# Patient Record
Sex: MALE | Race: WHITE | NOT HISPANIC OR LATINO | Employment: FULL TIME | URBAN - METROPOLITAN AREA
[De-identification: names, ages, dates, MRNs, and addresses within clinical notes are randomized per-mention and may not be internally consistent; named-entity substitution may affect disease eponyms.]

---

## 2018-05-15 ENCOUNTER — HOSPITAL ENCOUNTER (OUTPATIENT)
Dept: PREADMISSION TESTING | Facility: OTHER | Age: 63
Discharge: HOME | End: 2018-05-15
Attending: OTOLARYNGOLOGY
Payer: COMMERCIAL

## 2018-05-15 ENCOUNTER — ANESTHESIA EVENT (OUTPATIENT)
Dept: SURGERY | Facility: OTHER | Age: 63
End: 2018-05-15
Payer: COMMERCIAL

## 2018-05-15 VITALS
OXYGEN SATURATION: 97 % | HEART RATE: 78 BPM | BODY MASS INDEX: 23.7 KG/M2 | HEIGHT: 69 IN | WEIGHT: 160 LBS | SYSTOLIC BLOOD PRESSURE: 119 MMHG | DIASTOLIC BLOOD PRESSURE: 73 MMHG | TEMPERATURE: 99 F

## 2018-05-15 RX ORDER — LIDOCAINE HYDROCHLORIDE 10 MG/ML
0.5 INJECTION, SOLUTION EPIDURAL; INFILTRATION; INTRACAUDAL; PERINEURAL ONCE
Status: CANCELLED | OUTPATIENT
Start: 2018-05-15 | End: 2018-05-15

## 2018-05-15 RX ORDER — MIDAZOLAM HYDROCHLORIDE 5 MG/ML
5 INJECTION INTRAMUSCULAR; INTRAVENOUS
Status: DISCONTINUED | OUTPATIENT
Start: 2018-05-15 | End: 2018-05-16 | Stop reason: HOSPADM

## 2018-05-15 RX ORDER — SODIUM CHLORIDE, SODIUM LACTATE, POTASSIUM CHLORIDE, CALCIUM CHLORIDE 600; 310; 30; 20 MG/100ML; MG/100ML; MG/100ML; MG/100ML
INJECTION, SOLUTION INTRAVENOUS CONTINUOUS
Status: CANCELLED | OUTPATIENT
Start: 2018-05-15

## 2018-05-15 NOTE — DISCHARGE INSTRUCTIONS
PRE-ADMIT TESTING -  [phone]    [address]  [place] [facility]          Your surgery has been scheduled at [facility]. We are pleased to have the opportunity to serve you. For Further Information please call [phone].    On the day of surgery please report to the Information Desk on the 1st floor.    · CONTACT YOUR PHYSICIAN'S OFFICE THE DAY PRIOR TO YOUR SURGERY TO OBTAIN YOUR ARRIVAL TIME.     · The evening before surgery do not eat anything after 9 p.m. ( this includes hard candy, chewing gum and mints).  You may only have GATORADE, POWERADE AND WATER  from 9 p.m. until you leave your home.   DO NOT DRINK ANY LIQUIDS ON THE WAY TO THE HOSPITAL.      SPECIAL MEDICATION INSTRUCTIONS: TAKE medications checked off by the Anesthesiologist on your Medication List.    Angiogram Patients: Take medications as instructed by your physician, including aspirin.     Surgery Patients:    If you take ASPIRIN - Your PHYSICIAN/SURGEON will need to inform you IF/OR when you need to stop taking aspirin prior to your surgery.     Do Not take any medications containing IBUPROFEN.  Do Not Wear any make-up or dark nail polish   (especially eye make-up) to surgery. If you come to surgery with makeup on you will be required to remove the makeup or nail polish.    Do not shave your surgical area at least 5 days prior to your surgery. The surgical prep will be performed at the hospital according to Infection Control regulations.    Leave all valuables at home.   Do Not wear any jewelry or watches, including any metal in body piercings.  Contact Lens must be removed before surgery. Either do not wear the contact lens or bring a case and solution for storage.  Please bring a container for eyeglasses or dentures as required.  Bring any paperwork your physician has provided, such as consent forms,  history and physicals, doctor's orders, etc.   Bring comfortable clothes that are loose fitting to wear upon  discharge. Take into consideration the type of surgery being performed.  Maintain your diet as advised per your physician the day prior to surgery.      Adequate rest the night before surgery is advised.   Park in the Parking lot behind the hospital or in the [facility] across the street from the parking lot. Parking is complimentary.  If you will be discharged the same day as your procedure, please arrange for a responsible adult to drive you home or to accompany you if traveling by taxi.   YOU WILL NOT BE PERMITTED TO DRIVE OR TO LEAVE THE HOSPITAL ALONE AFTER SURGERY.   It is strongly recommended that you arrange for someone to remain with you for the first 24 hrs following your surgery.       Thank you for your cooperation.  The Staff of [facility].        Bathing Instructions                                                                 Please shower the evening before and morning of your procedure with    ANTIBACTERIAL SOAP. ( DIAL, etc )  Concentrate on the surgical area   for at least 3 minutes and rinse completely. Dry off as usual.   Do not use any deodorant, powder, body lotions, perfume, after shave or    cologne.

## 2018-05-15 NOTE — ANESTHESIA PREPROCEDURE EVALUATION
05/15/2018  Bib Freire is a 62 y.o., male.    Anesthesia Evaluation    I have reviewed the Patient Summary Reports.    I have reviewed the Nursing Notes.   I have reviewed the Medications.     Review of Systems  Anesthesia Hx:  Denies Family Hx of Anesthesia complications.   Denies Personal Hx of Anesthesia complications.   Social:  Former Smoker    Hematology/Oncology:  Hematology Normal   Oncology Normal     Cardiovascular:   Hypertension hyperlipidemia    Pulmonary:  Pulmonary Normal    Renal/:  Renal/ Normal     Hepatic/GI:  Hepatic/GI Normal    Neurological:   Seizures (No seizure in 40 years), well controlled    Endocrine:  Endocrine Normal        Physical Exam   Airway/Jaw/Neck:  Airway Findings: Mouth Opening: Normal Tongue: Normal  General Airway Assessment: Adult  TM Distance: Normal, at least 6 cm      Dental:  Dental Findings: In tact        Mental Status:  Mental Status Findings:  Alert and Oriented, Cooperative         Anesthesia Plan  Type of Anesthesia, risks & benefits discussed:  Anesthesia Type:  general  Patient's Preference:   Intra-op Monitoring Plan: standard ASA monitors  Intra-op Monitoring Plan Comments:   Post Op Pain Control Plan:   Post Op Pain Control Plan Comments:   Induction:   IV  Beta Blocker:         Informed Consent: Patient understands risks and agrees with Anesthesia plan.  Questions answered. Anesthesia consent signed with patient.  ASA Score: 2     Day of Surgery Review of History & Physical:    H&P update referred to the surgeon.     Anesthesia Plan Notes: Pt had recent labs at [facility].  Will get results,results inchart        Ready For Surgery From Anesthesia Perspective.

## 2018-05-16 ENCOUNTER — ANESTHESIA (OUTPATIENT)
Dept: SURGERY | Facility: OTHER | Age: 63
End: 2018-05-16
Payer: COMMERCIAL

## 2018-05-16 ENCOUNTER — HOSPITAL ENCOUNTER (OUTPATIENT)
Facility: OTHER | Age: 63
Discharge: HOME | End: 2018-05-16
Attending: OTOLARYNGOLOGY | Admitting: OTOLARYNGOLOGY
Payer: COMMERCIAL

## 2018-05-16 VITALS
HEIGHT: 69 IN | WEIGHT: 160 LBS | DIASTOLIC BLOOD PRESSURE: 72 MMHG | RESPIRATION RATE: 16 BRPM | BODY MASS INDEX: 23.7 KG/M2 | SYSTOLIC BLOOD PRESSURE: 121 MMHG | OXYGEN SATURATION: 99 % | TEMPERATURE: 98 F | HEART RATE: 80 BPM

## 2018-05-16 DIAGNOSIS — R22.1 MASS OF NECK: Primary | ICD-10-CM

## 2018-05-16 PROCEDURE — 25000003 PHARM REV CODE 250: Performed by: OTOLARYNGOLOGY

## 2018-05-16 PROCEDURE — 36000707: Performed by: OTOLARYNGOLOGY

## 2018-05-16 PROCEDURE — 25000003 PHARM REV CODE 250: Performed by: NURSE ANESTHETIST, CERTIFIED REGISTERED

## 2018-05-16 PROCEDURE — 88364 INSITU HYBRIDIZATION (FISH): CPT | Mod: 26,,, | Performed by: PATHOLOGY

## 2018-05-16 PROCEDURE — 27201423 OPTIME MED/SURG SUP & DEVICES STERILE SUPPLY: Performed by: OTOLARYNGOLOGY

## 2018-05-16 PROCEDURE — 25000003 PHARM REV CODE 250: Performed by: ANESTHESIOLOGY

## 2018-05-16 PROCEDURE — 71000015 HC POSTOP RECOV 1ST HR: Performed by: OTOLARYNGOLOGY

## 2018-05-16 PROCEDURE — 36000706: Performed by: OTOLARYNGOLOGY

## 2018-05-16 PROCEDURE — 88305 TISSUE EXAM BY PATHOLOGIST: CPT | Performed by: PATHOLOGY

## 2018-05-16 PROCEDURE — 88305 TISSUE EXAM BY PATHOLOGIST: CPT | Mod: 26,,, | Performed by: PATHOLOGY

## 2018-05-16 PROCEDURE — 63600175 PHARM REV CODE 636 W HCPCS: Performed by: NURSE ANESTHETIST, CERTIFIED REGISTERED

## 2018-05-16 PROCEDURE — 88342 IMHCHEM/IMCYTCHM 1ST ANTB: CPT | Performed by: PATHOLOGY

## 2018-05-16 PROCEDURE — 37000008 HC ANESTHESIA 1ST 15 MINUTES: Performed by: OTOLARYNGOLOGY

## 2018-05-16 PROCEDURE — 88185 FLOWCYTOMETRY/TC ADD-ON: CPT | Mod: 59 | Performed by: PATHOLOGY

## 2018-05-16 PROCEDURE — 88184 FLOWCYTOMETRY/ TC 1 MARKER: CPT | Performed by: PATHOLOGY

## 2018-05-16 PROCEDURE — 88342 IMHCHEM/IMCYTCHM 1ST ANTB: CPT | Mod: 26,59,, | Performed by: PATHOLOGY

## 2018-05-16 PROCEDURE — 71000033 HC RECOVERY, INTIAL HOUR: Performed by: OTOLARYNGOLOGY

## 2018-05-16 PROCEDURE — 37000009 HC ANESTHESIA EA ADD 15 MINS: Performed by: OTOLARYNGOLOGY

## 2018-05-16 PROCEDURE — 88189 FLOWCYTOMETRY/READ 16 & >: CPT | Mod: ,,, | Performed by: PATHOLOGY

## 2018-05-16 PROCEDURE — 88365 INSITU HYBRIDIZATION (FISH): CPT | Mod: 26,,, | Performed by: PATHOLOGY

## 2018-05-16 PROCEDURE — 88341 IMHCHEM/IMCYTCHM EA ADD ANTB: CPT | Mod: 26,59,, | Performed by: PATHOLOGY

## 2018-05-16 RX ORDER — FENTANYL CITRATE 50 UG/ML
25 INJECTION, SOLUTION INTRAMUSCULAR; INTRAVENOUS EVERY 5 MIN PRN
Status: DISCONTINUED | OUTPATIENT
Start: 2018-05-16 | End: 2018-05-16 | Stop reason: HOSPADM

## 2018-05-16 RX ORDER — MEPERIDINE HYDROCHLORIDE 50 MG/ML
12.5 INJECTION INTRAMUSCULAR; INTRAVENOUS; SUBCUTANEOUS ONCE AS NEEDED
Status: DISCONTINUED | OUTPATIENT
Start: 2018-05-16 | End: 2018-05-16 | Stop reason: HOSPADM

## 2018-05-16 RX ORDER — ONDANSETRON 8 MG/1
8 TABLET, ORALLY DISINTEGRATING ORAL EVERY 8 HOURS PRN
Qty: 10 TABLET | Refills: 0 | Status: SHIPPED | OUTPATIENT
Start: 2018-05-16 | End: 2018-07-12 | Stop reason: CLARIF

## 2018-05-16 RX ORDER — BACITRACIN 500 [USP'U]/G
OINTMENT TOPICAL
Status: DISCONTINUED | OUTPATIENT
Start: 2018-05-16 | End: 2018-05-16 | Stop reason: HOSPADM

## 2018-05-16 RX ORDER — EPHEDRINE SULFATE 50 MG/ML
INJECTION, SOLUTION INTRAVENOUS
Status: DISCONTINUED | OUTPATIENT
Start: 2018-05-16 | End: 2018-05-16

## 2018-05-16 RX ORDER — ONDANSETRON 8 MG/1
8 TABLET, ORALLY DISINTEGRATING ORAL EVERY 8 HOURS PRN
Status: DISCONTINUED | OUTPATIENT
Start: 2018-05-16 | End: 2018-05-16 | Stop reason: HOSPADM

## 2018-05-16 RX ORDER — SUCCINYLCHOLINE CHLORIDE 20 MG/ML
INJECTION INTRAMUSCULAR; INTRAVENOUS
Status: DISCONTINUED | OUTPATIENT
Start: 2018-05-16 | End: 2018-05-16

## 2018-05-16 RX ORDER — LIDOCAINE HCL/PF 100 MG/5ML
SYRINGE (ML) INTRAVENOUS
Status: DISCONTINUED | OUTPATIENT
Start: 2018-05-16 | End: 2018-05-16

## 2018-05-16 RX ORDER — ONDANSETRON 2 MG/ML
4 INJECTION INTRAMUSCULAR; INTRAVENOUS DAILY PRN
Status: DISCONTINUED | OUTPATIENT
Start: 2018-05-16 | End: 2018-05-16 | Stop reason: HOSPADM

## 2018-05-16 RX ORDER — LIDOCAINE HYDROCHLORIDE 10 MG/ML
0.5 INJECTION, SOLUTION EPIDURAL; INFILTRATION; INTRACAUDAL; PERINEURAL ONCE
Status: DISCONTINUED | OUTPATIENT
Start: 2018-05-16 | End: 2018-05-16 | Stop reason: HOSPADM

## 2018-05-16 RX ORDER — MIDAZOLAM HYDROCHLORIDE 1 MG/ML
INJECTION INTRAMUSCULAR; INTRAVENOUS
Status: DISCONTINUED | OUTPATIENT
Start: 2018-05-16 | End: 2018-05-16

## 2018-05-16 RX ORDER — CEPHALEXIN 500 MG/1
500 CAPSULE ORAL EVERY 8 HOURS
Qty: 21 CAPSULE | Refills: 0 | Status: SHIPPED | OUTPATIENT
Start: 2018-05-16 | End: 2018-07-12 | Stop reason: CLARIF

## 2018-05-16 RX ORDER — SODIUM CHLORIDE, SODIUM LACTATE, POTASSIUM CHLORIDE, CALCIUM CHLORIDE 600; 310; 30; 20 MG/100ML; MG/100ML; MG/100ML; MG/100ML
INJECTION, SOLUTION INTRAVENOUS CONTINUOUS
Status: DISCONTINUED | OUTPATIENT
Start: 2018-05-16 | End: 2018-05-16 | Stop reason: HOSPADM

## 2018-05-16 RX ORDER — HYDROCODONE BITARTRATE AND ACETAMINOPHEN 5; 325 MG/1; MG/1
1 TABLET ORAL EVERY 6 HOURS PRN
Qty: 20 TABLET | Refills: 0 | Status: SHIPPED | OUTPATIENT
Start: 2018-05-16 | End: 2018-07-12 | Stop reason: CLARIF

## 2018-05-16 RX ORDER — FENTANYL CITRATE 50 UG/ML
INJECTION, SOLUTION INTRAMUSCULAR; INTRAVENOUS
Status: DISCONTINUED | OUTPATIENT
Start: 2018-05-16 | End: 2018-05-16

## 2018-05-16 RX ORDER — PROPOFOL 10 MG/ML
VIAL (ML) INTRAVENOUS
Status: DISCONTINUED | OUTPATIENT
Start: 2018-05-16 | End: 2018-05-16

## 2018-05-16 RX ORDER — OXYCODONE HYDROCHLORIDE 5 MG/1
10 TABLET ORAL EVERY 4 HOURS PRN
Status: DISCONTINUED | OUTPATIENT
Start: 2018-05-16 | End: 2018-05-16 | Stop reason: HOSPADM

## 2018-05-16 RX ORDER — SODIUM CHLORIDE 0.9 % (FLUSH) 0.9 %
3 SYRINGE (ML) INJECTION
Status: DISCONTINUED | OUTPATIENT
Start: 2018-05-16 | End: 2018-05-16 | Stop reason: HOSPADM

## 2018-05-16 RX ORDER — ACETAMINOPHEN 10 MG/ML
INJECTION, SOLUTION INTRAVENOUS
Status: DISCONTINUED | OUTPATIENT
Start: 2018-05-16 | End: 2018-05-16

## 2018-05-16 RX ORDER — DEXAMETHASONE SODIUM PHOSPHATE 4 MG/ML
INJECTION, SOLUTION INTRA-ARTICULAR; INTRALESIONAL; INTRAMUSCULAR; INTRAVENOUS; SOFT TISSUE
Status: DISCONTINUED | OUTPATIENT
Start: 2018-05-16 | End: 2018-05-16

## 2018-05-16 RX ORDER — HYDROCODONE BITARTRATE AND ACETAMINOPHEN 5; 325 MG/1; MG/1
1 TABLET ORAL EVERY 4 HOURS PRN
Status: DISCONTINUED | OUTPATIENT
Start: 2018-05-16 | End: 2018-05-16 | Stop reason: HOSPADM

## 2018-05-16 RX ORDER — OXYCODONE HYDROCHLORIDE 5 MG/1
5 TABLET ORAL
Status: DISCONTINUED | OUTPATIENT
Start: 2018-05-16 | End: 2018-05-16 | Stop reason: HOSPADM

## 2018-05-16 RX ORDER — ONDANSETRON 2 MG/ML
INJECTION INTRAMUSCULAR; INTRAVENOUS
Status: DISCONTINUED | OUTPATIENT
Start: 2018-05-16 | End: 2018-05-16

## 2018-05-16 RX ORDER — LIDOCAINE HYDROCHLORIDE AND EPINEPHRINE 10; 10 MG/ML; UG/ML
INJECTION, SOLUTION INFILTRATION; PERINEURAL
Status: DISCONTINUED | OUTPATIENT
Start: 2018-05-16 | End: 2018-05-16 | Stop reason: HOSPADM

## 2018-05-16 RX ADMIN — SUCCINYLCHOLINE CHLORIDE 100 MG: 20 INJECTION, SOLUTION INTRAMUSCULAR; INTRAVENOUS at 10:05

## 2018-05-16 RX ADMIN — OXYCODONE HYDROCHLORIDE 5 MG: 5 TABLET ORAL at 11:05

## 2018-05-16 RX ADMIN — PROPOFOL 150 MG: 10 INJECTION, EMULSION INTRAVENOUS at 10:05

## 2018-05-16 RX ADMIN — SODIUM CHLORIDE, SODIUM LACTATE, POTASSIUM CHLORIDE, AND CALCIUM CHLORIDE: 600; 310; 30; 20 INJECTION, SOLUTION INTRAVENOUS at 09:05

## 2018-05-16 RX ADMIN — LIDOCAINE HYDROCHLORIDE 100 MG: 20 INJECTION, SOLUTION INTRAVENOUS at 10:05

## 2018-05-16 RX ADMIN — DEXTROSE 2 G: 50 INJECTION, SOLUTION INTRAVENOUS at 10:05

## 2018-05-16 RX ADMIN — MIDAZOLAM HYDROCHLORIDE 2 MG: 1 INJECTION, SOLUTION INTRAMUSCULAR; INTRAVENOUS at 10:05

## 2018-05-16 RX ADMIN — FENTANYL CITRATE 50 MCG: 50 INJECTION, SOLUTION INTRAMUSCULAR; INTRAVENOUS at 10:05

## 2018-05-16 RX ADMIN — ONDANSETRON 4 MG: 2 INJECTION INTRAMUSCULAR; INTRAVENOUS at 10:05

## 2018-05-16 RX ADMIN — EPHEDRINE SULFATE 10 MG: 50 INJECTION INTRAMUSCULAR; INTRAVENOUS; SUBCUTANEOUS at 10:05

## 2018-05-16 RX ADMIN — DEXAMETHASONE SODIUM PHOSPHATE 8 MG: 4 INJECTION, SOLUTION INTRAMUSCULAR; INTRAVENOUS at 10:05

## 2018-05-16 RX ADMIN — ACETAMINOPHEN 1000 MG: 10 INJECTION, SOLUTION INTRAVENOUS at 10:05

## 2018-05-16 NOTE — ANESTHESIA POSTPROCEDURE EVALUATION
"Anesthesia Post Evaluation    Patient: Bib Freire    Procedure(s) Performed: Procedure(s) (LRB):  EXCISION-LYMPH NODES - RIGHT NECK NODE (Right)    Final Anesthesia Type: general  Patient location during evaluation: PACU  Patient participation: Yes- Able to Participate  Level of consciousness: awake and alert  Post-procedure vital signs: reviewed and stable  Pain management: adequate  Airway patency: patent  PONV status at discharge: No PONV  Anesthetic complications: no      Cardiovascular status: blood pressure returned to baseline  Respiratory status: unassisted and room air  Hydration status: euvolemic  Follow-up not needed.        Visit Vitals  /72   Pulse 80   Temp 36.7 °C (98 °F) (Oral)   Resp 16   Ht 5' 9" (1.753 m)   Wt 72.6 kg (160 lb)   SpO2 99%   BMI 23.63 kg/m²       Pain/Mary Jo Score: Pain Assessment Performed: Yes (5/16/2018 12:25 PM)  Presence of Pain: denies (5/16/2018 12:25 PM)  Pain Rating Prior to Med Admin: 4 (5/16/2018 11:05 AM)  Pain Rating Post Med Admin: 0 (5/16/2018 11:35 AM)  Mary Jo Score: 10 (5/16/2018 12:43 PM)  Modified Mary Jo Score: 20 (5/16/2018 11:55 AM)      "

## 2018-05-16 NOTE — OP NOTE
DATE OF PROCEDURE:  05/16/2018.    PREOPERATIVE DIAGNOSIS:  Right cervical lymphadenopathy.    POSTOPERATIVE DIAGNOSIS:  Right cervical lymphadenopathy.    PROCEDURE PERFORMED:  Excisional biopsy of right neck mass.    SURGEON:  Chelsy Garcia M.D.    ASSISTANT:  Dr. Alex Gonzalez.    INDICATIONS FOR PROCEDURE:  This is a 62-year-old male who presented to clinic   with a right neck mass.  A final aspiration biopsy was performed, which revealed   no squamous cell carcinoma with abnormal lymphocytes seen.  A repeat FNA was   performed for flow cytometry that showed an abnormal B-cell population, high   suspicious, but not diagnostic for lymphoma.  It was decided to proceed with   open biopsy.    PROCEDURE NOTE IN DETAIL:  The patient was placed in supine position.  General   anesthesia was induced.  The patient was intubated per Anesthesia with no   difficulty.  The neck was prepped and draped in a sterile fashion.  The neck   skin overlying the neck mass was injected with 1% lidocaine with 1:100,000   epinephrine.  A curvilinear incision was made in the right neck overlying the   level II neck mass.  This was carried down through the subcutaneous tissues.    The lymph node was then identified, the lymph node was then dissected free from   the surrounding tissues, lymph node was noted to be superficial to the   sternocleidomastoid muscle.  The lymph node was then removed and sent fresh for   flow cytometry.  Hemostasis was ensured.  The wound was irrigated.  A small   piece of Surgicel was placed into the wound.  The subcutaneous tissues were   closed with Vicryl suture.  The skin was then closed with nylon.  Bacitracin   ointment was then placed on the skin.  The patient was awoken and brought out   from general anesthesia in satisfactory condition and brought to Recovery Room.    FINDINGS:  The patient had approximately 3 cm right level II lymph node.    BLOOD LOSS:  Approximately 5 mL.    COMPLICATIONS:   None.    CONDITION:  The patient was stable.      BAB/IN  dd: 05/16/2018 11:19:38 (CDT)  td: 05/16/2018 16:56:28 (CDT)  Doc ID   #[id number]  Job ID #[id number]    CC:

## 2018-05-16 NOTE — PLAN OF CARE
Bib Freire has met all discharge criteria from Phase II. Vital Signs are stable, ambulating  without difficulty. Discharge instructions given, patient verbalized understanding. Discharged from facility via wheelchair in stable condition.

## 2018-05-16 NOTE — BRIEF OP NOTE
[facility]  Brief Operative Note     SUMMARY     Surgery Date: 5/16/2018     Surgeon(s) and Role:     * Chelsy Garcia MD - Primary     * Ciaran Gonzalez MD - Assisting        Pre-op Diagnosis:  Mass in neck [R22.1]    Post-op Diagnosis:  Post-Op Diagnosis Codes:     * Mass in neck [R22.1]    Procedure(s) (LRB):  EXCISION-LYMPH NODES - RIGHT NECK NODE (Right)    Anesthesia: General    Description of the findings of the procedure: right level II lymph node    Findings/Key Components: same    Estimated Blood Loss: * No values recorded between 5/16/2018 10:25 AM and 5/16/2018 11:00 AM *         Specimens:   Specimen (12h ago through future)    None          Discharge Note    SUMMARY     Admit Date: 5/16/2018    Discharge Date and Time:  05/16/2018 11:16 AM    Hospital Course (synopsis of major diagnoses, care, treatment, and services provided during the course of the hospital stay): see operative report     Final Diagnosis: Post-Op Diagnosis Codes:     * Mass in neck [R22.1]    Disposition: Home or Self Care    Follow Up/Patient Instructions:     Medications:  Reconciled Home Medications:      Medication List      START taking these medications    cephALEXin 500 MG capsule  Commonly known as:  KEFLEX  Take 1 capsule (500 mg total) by mouth every 8 (eight) hours.     hydrocodone-acetaminophen 5-325 mg per tablet  Commonly known as:  NORCO  Take 1 tablet by mouth every 6 (six) hours as needed for Pain.     ondansetron 8 MG Tbdl  Commonly known as:  ZOFRAN-ODT  Take 1 tablet (8 mg total) by mouth every 8 (eight) hours as needed.        CONTINUE taking these medications    AMLODIPINE ORAL  Take by mouth once daily. Does not know dose, will bring info am of surgery     ATORVASTATIN ORAL  Take by mouth once daily. Will provide dose am of surgery     MIRTAZAPINE ORAL  Take by mouth every evening. Will provide dose info am of surgery            Discharge Procedure Orders  Diet general   Order  Comments: Start with clear liquids, Advance as tolerated to low fat, low acid diet.     Activity as tolerated     Other restrictions (specify):   Order Comments: Voice rest for 7 days     Call MD for:  persistent nausea and vomiting     Call MD for:  severe uncontrolled pain     Call MD for:  difficulty breathing, headache or visual disturbances       Follow-up Information     Chelsy Garcia MD In 1 week.    Specialty:  Otolaryngology  Contact information:  [address]  [place] [facility] [address]  [phone]

## 2018-05-16 NOTE — TRANSFER OF CARE
"Anesthesia Transfer of Care Note    Patient: Bib Freire    Procedure(s) Performed: Procedure(s) (LRB):  EXCISION-LYMPH NODES - RIGHT NECK NODE (Right)    Patient location: PACU    Anesthesia Type: general    Transport from OR: Transported from OR on 2-3 L/min O2 by NC with adequate spontaneous ventilation    Post pain: adequate analgesia    Post assessment: no apparent anesthetic complications and tolerated procedure well    Post vital signs: stable    Level of consciousness: awake, alert and oriented    Nausea/Vomiting: no nausea/vomiting    Complications: none    Transfer of care protocol was followed      Last vitals:   Visit Vitals  /66 (BP Location: Left arm, Patient Position: Lying)   Pulse 67   Temp 36.8 °C (98.3 °F) (Oral)   Resp 16   Ht 5' 9" (1.753 m)   Wt 72.6 kg (160 lb)   SpO2 96%   BMI 23.63 kg/m²     "

## 2018-05-16 NOTE — DISCHARGE INSTRUCTIONS
Okay to shower.  Apply bacitracin ointment 2x/day.  No heavy lifting or strenuous activity x 10 days.  Prevent pain medicine induced constipation.       Discharge Instructions: After Your Surgery  Youve just had surgery. During surgery, you were given medicine called anesthesia to keep you relaxed and free of pain. After surgery, you may have some pain or nausea. This is common. Here are some tips for feeling better and getting well after surgery.     Stay on schedule with your medicine.     oing home  Your healthcare provider will show you how to take care of yourself when you go home. He or she will also answer your questions. Have an adult family member or friend drive you home. For the first 24 hours after your surgery:    · Do not drive or use heavy equipment.  · Do not make important decisions or sign legal papers.  · Do not drink alcohol.  · Have someone stay with you, if needed. He or she can watch for problems and help keep you safe.    Be sure to go to all follow-up visits with your healthcare provider. And rest after your surgery for as long as your healthcare provider tells you to.    Coping with pain  If you have pain after surgery, pain medicine will help you feel better. Take it as told, before pain becomes severe. Also, ask your healthcare provider or pharmacist about other ways to control pain. This might be with heat, ice, or relaxation. And follow any other instructions your surgeon or nurse gives you.    Tips for taking pain medicine  To get the best relief possible, remember these points:    · Pain medicines can upset your stomach. Taking them with a little food may help.  · Most pain relievers taken by mouth need at least 20 to 30 minutes to start to work.  · Taking medicine on a schedule can help you remember to take it. Try to time your medicine so that you can take it before starting an activity. This might be before you get dressed, go for a walk, or sit down for dinner.  · Constipation is  a common side effect of pain medicines. Call your healthcare provider before taking any medicines such as laxatives or stool softeners to help ease constipation. Also ask if you should skip any foods. Drinking lots of fluids and eating foods such as fruits and vegetables that are high in fiber can also help. Remember, do not take laxatives unless your surgeon has prescribed them.  · Drinking alcohol and taking pain medicine can cause dizziness and slow your breathing. It can even be deadly. Do not drink alcohol while taking pain medicine.  · Pain medicine can make you react more slowly to things. Do not drive or run machinery while taking pain medicine.    Your healthcare provider may tell you to take acetaminophen to help ease your pain. Ask him or her how much you are supposed to take each day. Acetaminophen or other pain relievers may interact with your prescription medicines or other over-the-counter (OTC) medicines. Some prescription medicines have acetaminophen and other ingredients. Using both prescription and OTC acetaminophen for pain can cause you to overdose. Read the labels on your OTC medicines with care. This will help you to clearly know the list of ingredients, how much to take, and any warnings. It may also help you not take too much acetaminophen. If you have questions or do not understand the information, ask your pharmacist or healthcare provider to explain it to you before you take the OTC medicine.    Managing nausea  Some people have an upset stomach after surgery. This is often because of anesthesia, pain, or pain medicine, or the stress of surgery. These tips will help you handle nausea and eat healthy foods as you get better. If you were on a special food plan before surgery, ask your healthcare provider if you should follow it while you get better. These tips may help:    · Do not push yourself to eat. Your body will tell you when to eat and how much.  · Start off with clear liquids and  soup. They are easier to digest.  · Next try semi-solid foods, such as mashed potatoes, applesauce, and gelatin, as you feel ready.  · Slowly move to solid foods. Dont eat fatty, rich, or spicy foods at first.  · Do not force yourself to have 3 large meals a day. Instead eat smaller amounts more often.  · Take pain medicines with a small amount of solid food, such as crackers or toast, to avoid nausea.     Call your surgeon if  · You still have pain an hour after taking medicine. The medicine may not be strong enough.  · You feel too sleepy, dizzy, or groggy. The medicine may be too strong.  · You have side effects like nausea, vomiting, or skin changes, such as rash, itching, or hives.       If you have obstructive sleep apnea  You were given anesthesia medicine during surgery to keep you comfortable and free of pain. After surgery, you may have more apnea spells because of this medicine and other medicines you were given. The spells may last longer than usual.   At home:    · Keep using the continuous positive airway pressure (CPAP) device when you sleep. Unless your healthcare provider tells you not to, use it when you sleep, day or night. CPAP is a common device used to treat obstructive sleep apnea.  · Talk with your provider before taking any pain medicine, muscle relaxants, or sedatives. Your provider will tell you about the possible dangers of taking these medicines.    © 2441-8234 [employer]. [address] [place] [address], [place] [address]. All rights reserved. This information is not intended as a substitute for professional medical care. Always follow your healthcare professional's instructions.    PLEASE FOLLOW ANY OTHER INSTRUCTIONS PROVIDED TO YOU BY DR. GASTON!

## 2018-05-16 NOTE — INTERVAL H&P NOTE
The patient has been examined and the H&P has been reviewed:    I concur with the findings and no changes have occurred since H&P was written.    Anesthesia/Surgery risks, benefits and alternative options discussed and understood by patient/family.          Active Hospital Problems    Diagnosis  POA    Mass of neck [R22.1]  Yes      Resolved Hospital Problems    Diagnosis Date Resolved POA   No resolved problems to display.

## 2018-05-17 LAB
FLOW CYTOMETRY ANTIBODIES ANALYZED - TISSUE: NORMAL
FLOW CYTOMETRY COMMENT - TISSUE: NORMAL
FLOW CYTOMETRY INTERPRETATION - TISSUE: NORMAL
SPECIMEN TYPE - TISSUE: NORMAL

## 2018-06-18 NOTE — PHYSICIAN QUERY
PT Name: Bib Freire  MR #: [id number]    Physician Query Form - Pathology Findings Clarification     CDS/: Trudy Yoder               Contact information:  This form is a permanent document in the medical record.     Query Date: June 18, 2018      By submitting this query, we are merely seeking further clarification of documentation.  Please utilize your independent clinical judgment when addressing the question(s) below.      The medical record contains the following:     Findings Supporting Clinical Information Location in Medical Record   Right cervical lymphadenopathy  Op report      Please document the clinical significance of the Pathologists findings of _Atypical B-cell lymphoid infiltrate. (lymphadenitis)          [  ] I agree with the Pathology Findings        [  ] I do not agree with the Pathology Findings        [  ] Clinically Insignificant        [  ] Clinically Undetermined        [  ] Other/Clarification of Findings: ______________________________________________    Please document in your progress notes daily for the duration of treatment until resolved and include in your discharge summary.

## 2018-07-05 ENCOUNTER — HOSPITAL ENCOUNTER (OUTPATIENT)
Dept: RADIOLOGY | Facility: OTHER | Age: 63
Discharge: HOME | End: 2018-07-05
Attending: OTOLARYNGOLOGY
Payer: COMMERCIAL

## 2018-07-05 DIAGNOSIS — C85.90 LYMPHOMA: ICD-10-CM

## 2018-07-05 DIAGNOSIS — R22.1 NECK MASS: ICD-10-CM

## 2018-07-05 DIAGNOSIS — R22.1 NECK MASS: Primary | ICD-10-CM

## 2018-07-05 PROCEDURE — 25500020 PHARM REV CODE 255: Performed by: OTOLARYNGOLOGY

## 2018-07-05 PROCEDURE — 74177 CT ABD & PELVIS W/CONTRAST: CPT | Mod: 26,,, | Performed by: RADIOLOGY

## 2018-07-05 PROCEDURE — 74177 CT ABD & PELVIS W/CONTRAST: CPT | Mod: TC

## 2018-07-05 PROCEDURE — 71046 X-RAY EXAM CHEST 2 VIEWS: CPT | Mod: 26,,, | Performed by: RADIOLOGY

## 2018-07-05 PROCEDURE — 71046 X-RAY EXAM CHEST 2 VIEWS: CPT | Mod: TC,FY

## 2018-07-05 PROCEDURE — 70491 CT SOFT TISSUE NECK W/DYE: CPT | Mod: TC

## 2018-07-05 PROCEDURE — 70491 CT SOFT TISSUE NECK W/DYE: CPT | Mod: 26,,, | Performed by: RADIOLOGY

## 2018-07-05 RX ADMIN — IOHEXOL 75 ML: 350 INJECTION, SOLUTION INTRAVENOUS at 10:07

## 2018-07-05 RX ADMIN — IOHEXOL 30 ML: 350 INJECTION, SOLUTION INTRAVENOUS at 11:07

## 2018-07-12 RX ORDER — MELATONIN 5 MG
CAPSULE ORAL NIGHTLY
COMMUNITY

## 2018-07-12 RX ORDER — NAPROXEN SODIUM 220 MG/1
81 TABLET, FILM COATED ORAL DAILY
Status: ON HOLD | COMMUNITY
End: 2018-07-19 | Stop reason: HOSPADM

## 2018-07-16 ENCOUNTER — ANESTHESIA EVENT (OUTPATIENT)
Dept: SURGERY | Facility: OTHER | Age: 63
DRG: 908 | End: 2018-07-16
Payer: COMMERCIAL

## 2018-07-17 ENCOUNTER — HOSPITAL ENCOUNTER (INPATIENT)
Facility: OTHER | Age: 63
LOS: 2 days | Discharge: HOME | DRG: 908 | End: 2018-07-19
Attending: OTOLARYNGOLOGY | Admitting: OTOLARYNGOLOGY
Payer: COMMERCIAL

## 2018-07-17 ENCOUNTER — ANESTHESIA (OUTPATIENT)
Dept: SURGERY | Facility: OTHER | Age: 63
DRG: 908 | End: 2018-07-17
Payer: COMMERCIAL

## 2018-07-17 ENCOUNTER — ANESTHESIA EVENT (OUTPATIENT)
Dept: SURGERY | Facility: OTHER | Age: 63
DRG: 908 | End: 2018-07-17
Payer: COMMERCIAL

## 2018-07-17 DIAGNOSIS — R22.1 MASS OF NECK: Primary | ICD-10-CM

## 2018-07-17 LAB
ABO GROUP BLD: NORMAL
ALLENS TEST: ABNORMAL
BLD GP AB SCN CELLS X3 SERPL QL: NORMAL
DELSYS: ABNORMAL
ERYTHROCYTE [SEDIMENTATION RATE] IN BLOOD BY WESTERGREN METHOD: 12 MM/H
ETCO2: 34
FIO2: 80
HCO3 UR-SCNC: 23.2 MMOL/L (ref 24–28)
MIN VOL: 12.3
MODE: ABNORMAL
PCO2 BLDA: 30.6 MMHG (ref 35–45)
PEEP: 5
PH SMN: 7.49 [PH] (ref 7.35–7.45)
PIP: 22
PO2 BLDA: 183 MMHG (ref 80–100)
POC BE: 0 MMOL/L
POC SATURATED O2: 100 % (ref 95–100)
RH BLD: NORMAL
SAMPLE: ABNORMAL
SITE: ABNORMAL
SP02: 100
VT: 500

## 2018-07-17 PROCEDURE — 88365 INSITU HYBRIDIZATION (FISH): CPT | Mod: 26,,, | Performed by: PATHOLOGY

## 2018-07-17 PROCEDURE — 63600175 PHARM REV CODE 636 W HCPCS: Performed by: NURSE ANESTHETIST, CERTIFIED REGISTERED

## 2018-07-17 PROCEDURE — P9045 ALBUMIN (HUMAN), 5%, 250 ML: HCPCS | Mod: JG | Performed by: NURSE ANESTHETIST, CERTIFIED REGISTERED

## 2018-07-17 PROCEDURE — 71000033 HC RECOVERY, INTIAL HOUR: Performed by: OTOLARYNGOLOGY

## 2018-07-17 PROCEDURE — C1729 CATH, DRAINAGE: HCPCS | Performed by: OTOLARYNGOLOGY

## 2018-07-17 PROCEDURE — 37000008 HC ANESTHESIA 1ST 15 MINUTES: Performed by: OTOLARYNGOLOGY

## 2018-07-17 PROCEDURE — 63600175 PHARM REV CODE 636 W HCPCS: Performed by: INTERNAL MEDICINE

## 2018-07-17 PROCEDURE — 25000003 PHARM REV CODE 250: Performed by: NURSE ANESTHETIST, CERTIFIED REGISTERED

## 2018-07-17 PROCEDURE — 27100092 HC HIGH FLOW DELIVERY CANNULA

## 2018-07-17 PROCEDURE — 88184 FLOWCYTOMETRY/ TC 1 MARKER: CPT | Performed by: PATHOLOGY

## 2018-07-17 PROCEDURE — 27201423 OPTIME MED/SURG SUP & DEVICES STERILE SUPPLY: Performed by: OTOLARYNGOLOGY

## 2018-07-17 PROCEDURE — 25000003 PHARM REV CODE 250: Performed by: OTOLARYNGOLOGY

## 2018-07-17 PROCEDURE — 88189 FLOWCYTOMETRY/READ 16 & >: CPT | Mod: ,,, | Performed by: PATHOLOGY

## 2018-07-17 PROCEDURE — 63600175 PHARM REV CODE 636 W HCPCS: Performed by: OTOLARYNGOLOGY

## 2018-07-17 PROCEDURE — 36415 COLL VENOUS BLD VENIPUNCTURE: CPT

## 2018-07-17 PROCEDURE — 86920 COMPATIBILITY TEST SPIN: CPT

## 2018-07-17 PROCEDURE — 0CJS8ZZ INSPECTION OF LARYNX, VIA NATURAL OR ARTIFICIAL OPENING ENDOSCOPIC: ICD-10-PCS | Performed by: OTOLARYNGOLOGY

## 2018-07-17 PROCEDURE — 82803 BLOOD GASES ANY COMBINATION: CPT

## 2018-07-17 PROCEDURE — 88342 IMHCHEM/IMCYTCHM 1ST ANTB: CPT | Mod: 26,59,, | Performed by: PATHOLOGY

## 2018-07-17 PROCEDURE — 88185 FLOWCYTOMETRY/TC ADD-ON: CPT | Mod: 59 | Performed by: PATHOLOGY

## 2018-07-17 PROCEDURE — 27000221 HC OXYGEN, UP TO 24 HOURS

## 2018-07-17 PROCEDURE — 88341 IMHCHEM/IMCYTCHM EA ADD ANTB: CPT | Performed by: PATHOLOGY

## 2018-07-17 PROCEDURE — 0W960ZZ DRAINAGE OF NECK, OPEN APPROACH: ICD-10-PCS | Performed by: OTOLARYNGOLOGY

## 2018-07-17 PROCEDURE — 99900035 HC TECH TIME PER 15 MIN (STAT)

## 2018-07-17 PROCEDURE — 88342 IMHCHEM/IMCYTCHM 1ST ANTB: CPT | Performed by: PATHOLOGY

## 2018-07-17 PROCEDURE — 94002 VENT MGMT INPAT INIT DAY: CPT

## 2018-07-17 PROCEDURE — 25000003 PHARM REV CODE 250: Performed by: ANESTHESIOLOGY

## 2018-07-17 PROCEDURE — 07T10ZZ RESECTION OF RIGHT NECK LYMPHATIC, OPEN APPROACH: ICD-10-PCS | Performed by: OTOLARYNGOLOGY

## 2018-07-17 PROCEDURE — 88341 IMHCHEM/IMCYTCHM EA ADD ANTB: CPT | Mod: 26,59,, | Performed by: PATHOLOGY

## 2018-07-17 PROCEDURE — P9038 RBC IRRADIATED: HCPCS

## 2018-07-17 PROCEDURE — S0028 INJECTION, FAMOTIDINE, 20 MG: HCPCS | Performed by: INTERNAL MEDICINE

## 2018-07-17 PROCEDURE — 37000009 HC ANESTHESIA EA ADD 15 MINS: Performed by: OTOLARYNGOLOGY

## 2018-07-17 PROCEDURE — 94761 N-INVAS EAR/PLS OXIMETRY MLT: CPT

## 2018-07-17 PROCEDURE — 36000706: Performed by: OTOLARYNGOLOGY

## 2018-07-17 PROCEDURE — 94770 HC EXHALED C02 TEST: CPT

## 2018-07-17 PROCEDURE — 86850 RBC ANTIBODY SCREEN: CPT

## 2018-07-17 PROCEDURE — 25000003 PHARM REV CODE 250: Performed by: INTERNAL MEDICINE

## 2018-07-17 PROCEDURE — 27100171 HC OXYGEN HIGH FLOW UP TO 24 HOURS

## 2018-07-17 PROCEDURE — 88307 TISSUE EXAM BY PATHOLOGIST: CPT | Mod: 26,,, | Performed by: PATHOLOGY

## 2018-07-17 PROCEDURE — 71000039 HC RECOVERY, EACH ADD'L HOUR: Performed by: OTOLARYNGOLOGY

## 2018-07-17 PROCEDURE — 36000707: Performed by: OTOLARYNGOLOGY

## 2018-07-17 PROCEDURE — 20000000 HC ICU ROOM

## 2018-07-17 RX ORDER — EPHEDRINE SULFATE 50 MG/ML
INJECTION, SOLUTION INTRAVENOUS
Status: DISCONTINUED | OUTPATIENT
Start: 2018-07-17 | End: 2018-07-17

## 2018-07-17 RX ORDER — BACITRACIN ZINC 500 UNIT/G
OINTMENT (GRAM) TOPICAL 3 TIMES DAILY
Status: DISCONTINUED | OUTPATIENT
Start: 2018-07-17 | End: 2018-07-19 | Stop reason: HOSPADM

## 2018-07-17 RX ORDER — ONDANSETRON 2 MG/ML
INJECTION INTRAMUSCULAR; INTRAVENOUS
Status: DISCONTINUED | OUTPATIENT
Start: 2018-07-17 | End: 2018-07-17

## 2018-07-17 RX ORDER — DEXAMETHASONE SODIUM PHOSPHATE 4 MG/ML
INJECTION, SOLUTION INTRA-ARTICULAR; INTRALESIONAL; INTRAMUSCULAR; INTRAVENOUS; SOFT TISSUE
Status: DISCONTINUED | OUTPATIENT
Start: 2018-07-17 | End: 2018-07-17

## 2018-07-17 RX ORDER — DEXTROSE MONOHYDRATE AND SODIUM CHLORIDE 5; .9 G/100ML; G/100ML
INJECTION, SOLUTION INTRAVENOUS CONTINUOUS
Status: DISCONTINUED | OUTPATIENT
Start: 2018-07-17 | End: 2018-07-17

## 2018-07-17 RX ORDER — FENTANYL CITRATE 50 UG/ML
25 INJECTION, SOLUTION INTRAMUSCULAR; INTRAVENOUS EVERY 5 MIN PRN
Status: DISCONTINUED | OUTPATIENT
Start: 2018-07-17 | End: 2018-07-17 | Stop reason: HOSPADM

## 2018-07-17 RX ORDER — ALBUMIN HUMAN 50 G/1000ML
SOLUTION INTRAVENOUS CONTINUOUS PRN
Status: DISCONTINUED | OUTPATIENT
Start: 2018-07-17 | End: 2018-07-17

## 2018-07-17 RX ORDER — FENTANYL CITRATE 50 UG/ML
INJECTION, SOLUTION INTRAMUSCULAR; INTRAVENOUS
Status: DISCONTINUED | OUTPATIENT
Start: 2018-07-17 | End: 2018-07-17

## 2018-07-17 RX ORDER — BACITRACIN ZINC 500 UNIT/G
OINTMENT (GRAM) TOPICAL
Status: DISCONTINUED | OUTPATIENT
Start: 2018-07-17 | End: 2018-07-17 | Stop reason: HOSPADM

## 2018-07-17 RX ORDER — AMLODIPINE BESYLATE 5 MG/1
5 TABLET ORAL DAILY
Status: DISCONTINUED | OUTPATIENT
Start: 2018-07-17 | End: 2018-07-19 | Stop reason: HOSPADM

## 2018-07-17 RX ORDER — SODIUM CHLORIDE 0.9 % (FLUSH) 0.9 %
3 SYRINGE (ML) INJECTION
Status: DISCONTINUED | OUTPATIENT
Start: 2018-07-17 | End: 2018-07-19 | Stop reason: HOSPADM

## 2018-07-17 RX ORDER — BACITRACIN ZINC 500 UNIT/G
OINTMENT (GRAM) TOPICAL 2 TIMES DAILY
Status: DISCONTINUED | OUTPATIENT
Start: 2018-07-17 | End: 2018-07-17 | Stop reason: SDUPTHER

## 2018-07-17 RX ORDER — PROPOFOL 10 MG/ML
10 INJECTION, EMULSION INTRAVENOUS CONTINUOUS
Status: DISCONTINUED | OUTPATIENT
Start: 2018-07-17 | End: 2018-07-17

## 2018-07-17 RX ORDER — CEFAZOLIN SODIUM 2 G/50ML
2 SOLUTION INTRAVENOUS
Status: DISCONTINUED | OUTPATIENT
Start: 2018-07-17 | End: 2018-07-17

## 2018-07-17 RX ORDER — CEFAZOLIN SODIUM 500 MG/2.2ML
25 INJECTION, POWDER, FOR SOLUTION INTRAMUSCULAR; INTRAVENOUS
Status: DISCONTINUED | OUTPATIENT
Start: 2018-07-17 | End: 2018-07-17 | Stop reason: SDUPTHER

## 2018-07-17 RX ORDER — EPINEPHRINE 0.1 MG/ML
INJECTION INTRAVENOUS
Status: DISCONTINUED | OUTPATIENT
Start: 2018-07-17 | End: 2018-07-17 | Stop reason: HOSPADM

## 2018-07-17 RX ORDER — OXYCODONE HYDROCHLORIDE 5 MG/1
5 TABLET ORAL
Status: DISCONTINUED | OUTPATIENT
Start: 2018-07-17 | End: 2018-07-17 | Stop reason: HOSPADM

## 2018-07-17 RX ORDER — LIDOCAINE HYDROCHLORIDE 10 MG/ML
0.5 INJECTION, SOLUTION EPIDURAL; INFILTRATION; INTRACAUDAL; PERINEURAL ONCE
Status: DISCONTINUED | OUTPATIENT
Start: 2018-07-17 | End: 2018-07-17 | Stop reason: HOSPADM

## 2018-07-17 RX ORDER — SODIUM CHLORIDE, SODIUM LACTATE, POTASSIUM CHLORIDE, CALCIUM CHLORIDE 600; 310; 30; 20 MG/100ML; MG/100ML; MG/100ML; MG/100ML
INJECTION, SOLUTION INTRAVENOUS CONTINUOUS PRN
Status: DISCONTINUED | OUTPATIENT
Start: 2018-07-17 | End: 2018-07-17

## 2018-07-17 RX ORDER — CEPHALEXIN 500 MG/1
500 CAPSULE ORAL EVERY 8 HOURS
Status: DISCONTINUED | OUTPATIENT
Start: 2018-07-17 | End: 2018-07-17

## 2018-07-17 RX ORDER — SODIUM CHLORIDE 9 MG/ML
INJECTION, SOLUTION INTRAVENOUS CONTINUOUS
Status: DISCONTINUED | OUTPATIENT
Start: 2018-07-17 | End: 2018-07-18

## 2018-07-17 RX ORDER — LIDOCAINE HYDROCHLORIDE AND EPINEPHRINE 10; 10 MG/ML; UG/ML
INJECTION, SOLUTION INFILTRATION; PERINEURAL
Status: DISCONTINUED | OUTPATIENT
Start: 2018-07-17 | End: 2018-07-17 | Stop reason: HOSPADM

## 2018-07-17 RX ORDER — GLYCOPYRROLATE 0.2 MG/ML
INJECTION INTRAMUSCULAR; INTRAVENOUS
Status: DISCONTINUED | OUTPATIENT
Start: 2018-07-17 | End: 2018-07-17

## 2018-07-17 RX ORDER — DIPHENHYDRAMINE HYDROCHLORIDE 50 MG/ML
12.5 INJECTION INTRAMUSCULAR; INTRAVENOUS EVERY 30 MIN PRN
Status: DISCONTINUED | OUTPATIENT
Start: 2018-07-17 | End: 2018-07-17 | Stop reason: HOSPADM

## 2018-07-17 RX ORDER — SUCCINYLCHOLINE CHLORIDE 20 MG/ML
INJECTION INTRAMUSCULAR; INTRAVENOUS
Status: DISCONTINUED | OUTPATIENT
Start: 2018-07-17 | End: 2018-07-17

## 2018-07-17 RX ORDER — HYDROMORPHONE HYDROCHLORIDE 2 MG/ML
0.4 INJECTION, SOLUTION INTRAMUSCULAR; INTRAVENOUS; SUBCUTANEOUS EVERY 5 MIN PRN
Status: DISCONTINUED | OUTPATIENT
Start: 2018-07-17 | End: 2018-07-17 | Stop reason: HOSPADM

## 2018-07-17 RX ORDER — PROPOFOL 10 MG/ML
10 INJECTION, EMULSION INTRAVENOUS CONTINUOUS
Status: DISPENSED | OUTPATIENT
Start: 2018-07-17 | End: 2018-07-18

## 2018-07-17 RX ORDER — MIDAZOLAM HYDROCHLORIDE 1 MG/ML
INJECTION INTRAMUSCULAR; INTRAVENOUS
Status: DISCONTINUED | OUTPATIENT
Start: 2018-07-17 | End: 2018-07-17

## 2018-07-17 RX ORDER — PROPOFOL 10 MG/ML
VIAL (ML) INTRAVENOUS
Status: DISCONTINUED | OUTPATIENT
Start: 2018-07-17 | End: 2018-07-17

## 2018-07-17 RX ORDER — CHLORHEXIDINE GLUCONATE ORAL RINSE 1.2 MG/ML
15 SOLUTION DENTAL 2 TIMES DAILY
Status: DISCONTINUED | OUTPATIENT
Start: 2018-07-17 | End: 2018-07-19 | Stop reason: HOSPADM

## 2018-07-17 RX ORDER — SODIUM CHLORIDE, SODIUM LACTATE, POTASSIUM CHLORIDE, CALCIUM CHLORIDE 600; 310; 30; 20 MG/100ML; MG/100ML; MG/100ML; MG/100ML
INJECTION, SOLUTION INTRAVENOUS CONTINUOUS
Status: DISCONTINUED | OUTPATIENT
Start: 2018-07-17 | End: 2018-07-17

## 2018-07-17 RX ORDER — MEPERIDINE HYDROCHLORIDE 50 MG/ML
12.5 INJECTION INTRAMUSCULAR; INTRAVENOUS; SUBCUTANEOUS ONCE AS NEEDED
Status: DISCONTINUED | OUTPATIENT
Start: 2018-07-17 | End: 2018-07-17 | Stop reason: HOSPADM

## 2018-07-17 RX ORDER — LIDOCAINE HCL/PF 100 MG/5ML
SYRINGE (ML) INTRAVENOUS
Status: DISCONTINUED | OUTPATIENT
Start: 2018-07-17 | End: 2018-07-17

## 2018-07-17 RX ORDER — ACETAMINOPHEN 10 MG/ML
INJECTION, SOLUTION INTRAVENOUS
Status: DISCONTINUED | OUTPATIENT
Start: 2018-07-17 | End: 2018-07-17

## 2018-07-17 RX ORDER — FAMOTIDINE 10 MG/ML
20 INJECTION INTRAVENOUS 2 TIMES DAILY
Status: DISCONTINUED | OUTPATIENT
Start: 2018-07-17 | End: 2018-07-19 | Stop reason: HOSPADM

## 2018-07-17 RX ORDER — ROCURONIUM BROMIDE 10 MG/ML
INJECTION, SOLUTION INTRAVENOUS
Status: DISCONTINUED | OUTPATIENT
Start: 2018-07-17 | End: 2018-07-17

## 2018-07-17 RX ORDER — HYDROCODONE BITARTRATE AND ACETAMINOPHEN 500; 5 MG/1; MG/1
TABLET ORAL
Status: DISCONTINUED | OUTPATIENT
Start: 2018-07-17 | End: 2018-07-19 | Stop reason: HOSPADM

## 2018-07-17 RX ORDER — ONDANSETRON 2 MG/ML
4 INJECTION INTRAMUSCULAR; INTRAVENOUS DAILY PRN
Status: DISCONTINUED | OUTPATIENT
Start: 2018-07-17 | End: 2018-07-17 | Stop reason: HOSPADM

## 2018-07-17 RX ORDER — CEFAZOLIN SODIUM 2 G/50ML
2 SOLUTION INTRAVENOUS
Status: COMPLETED | OUTPATIENT
Start: 2018-07-18 | End: 2018-07-18

## 2018-07-17 RX ORDER — HYDROCODONE BITARTRATE AND ACETAMINOPHEN 5; 325 MG/1; MG/1
1 TABLET ORAL EVERY 4 HOURS PRN
Status: DISCONTINUED | OUTPATIENT
Start: 2018-07-17 | End: 2018-07-17

## 2018-07-17 RX ORDER — PHENYLEPHRINE HYDROCHLORIDE 10 MG/ML
INJECTION INTRAVENOUS
Status: DISCONTINUED | OUTPATIENT
Start: 2018-07-17 | End: 2018-07-17

## 2018-07-17 RX ORDER — ATORVASTATIN CALCIUM 20 MG/1
40 TABLET, FILM COATED ORAL DAILY
Status: DISCONTINUED | OUTPATIENT
Start: 2018-07-17 | End: 2018-07-19 | Stop reason: HOSPADM

## 2018-07-17 RX ORDER — MORPHINE SULFATE 2 MG/ML
2 INJECTION, SOLUTION INTRAMUSCULAR; INTRAVENOUS EVERY 4 HOURS PRN
Status: DISCONTINUED | OUTPATIENT
Start: 2018-07-17 | End: 2018-07-19 | Stop reason: HOSPADM

## 2018-07-17 RX ORDER — ONDANSETRON 8 MG/1
8 TABLET, ORALLY DISINTEGRATING ORAL EVERY 8 HOURS PRN
Status: DISCONTINUED | OUTPATIENT
Start: 2018-07-17 | End: 2018-07-17

## 2018-07-17 RX ADMIN — EPHEDRINE SULFATE 10 MG: 50 INJECTION INTRAMUSCULAR; INTRAVENOUS; SUBCUTANEOUS at 07:07

## 2018-07-17 RX ADMIN — PROPOFOL 160 MG: 10 INJECTION, EMULSION INTRAVENOUS at 07:07

## 2018-07-17 RX ADMIN — MIDAZOLAM HYDROCHLORIDE 2 MG: 1 INJECTION, SOLUTION INTRAMUSCULAR; INTRAVENOUS at 07:07

## 2018-07-17 RX ADMIN — ROCURONIUM BROMIDE 20 MG: 10 INJECTION INTRAVENOUS at 07:07

## 2018-07-17 RX ADMIN — PHENYLEPHRINE HYDROCHLORIDE 200 MCG: 10 INJECTION INTRAVENOUS at 06:07

## 2018-07-17 RX ADMIN — CARBOXYMETHYLCELLULOSE SODIUM 2 DROP: 2.5 SOLUTION/ DROPS OPHTHALMIC at 07:07

## 2018-07-17 RX ADMIN — ROCURONIUM BROMIDE 30 MG: 10 INJECTION INTRAVENOUS at 06:07

## 2018-07-17 RX ADMIN — LIDOCAINE HYDROCHLORIDE 75 MG: 20 INJECTION, SOLUTION INTRAVENOUS at 07:07

## 2018-07-17 RX ADMIN — ROCURONIUM BROMIDE 10 MG: 10 INJECTION, SOLUTION INTRAVENOUS at 07:07

## 2018-07-17 RX ADMIN — DEXAMETHASONE SODIUM PHOSPHATE 10 MG: 4 INJECTION, SOLUTION INTRAMUSCULAR; INTRAVENOUS at 07:07

## 2018-07-17 RX ADMIN — SODIUM CHLORIDE: 0.9 INJECTION, SOLUTION INTRAVENOUS at 08:07

## 2018-07-17 RX ADMIN — MIDAZOLAM HYDROCHLORIDE 5 MG: 1 INJECTION, SOLUTION INTRAMUSCULAR; INTRAVENOUS at 07:07

## 2018-07-17 RX ADMIN — DEXTROSE AND SODIUM CHLORIDE: 5; .9 INJECTION, SOLUTION INTRAVENOUS at 01:07

## 2018-07-17 RX ADMIN — EPHEDRINE SULFATE 15 MG: 50 INJECTION INTRAMUSCULAR; INTRAVENOUS; SUBCUTANEOUS at 07:07

## 2018-07-17 RX ADMIN — SODIUM CHLORIDE, SODIUM LACTATE, POTASSIUM CHLORIDE, AND CALCIUM CHLORIDE: 600; 310; 30; 20 INJECTION, SOLUTION INTRAVENOUS at 07:07

## 2018-07-17 RX ADMIN — MIDAZOLAM HYDROCHLORIDE 2 MG: 1 INJECTION, SOLUTION INTRAMUSCULAR; INTRAVENOUS at 06:07

## 2018-07-17 RX ADMIN — ATORVASTATIN CALCIUM 40 MG: 20 TABLET, FILM COATED ORAL at 02:07

## 2018-07-17 RX ADMIN — SUCCINYLCHOLINE CHLORIDE 140 MG: 20 INJECTION, SOLUTION INTRAMUSCULAR; INTRAVENOUS at 07:07

## 2018-07-17 RX ADMIN — DEXAMETHASONE SODIUM PHOSPHATE 8 MG: 4 INJECTION, SOLUTION INTRAMUSCULAR; INTRAVENOUS at 07:07

## 2018-07-17 RX ADMIN — SUCCINYLCHOLINE CHLORIDE 160 MG: 20 INJECTION, SOLUTION INTRAMUSCULAR; INTRAVENOUS at 06:07

## 2018-07-17 RX ADMIN — ALBUMIN (HUMAN): 2.5 SOLUTION INTRAVENOUS at 06:07

## 2018-07-17 RX ADMIN — PHENYLEPHRINE HYDROCHLORIDE 100 MCG: 10 INJECTION INTRAVENOUS at 07:07

## 2018-07-17 RX ADMIN — FENTANYL CITRATE 50 MCG: 50 INJECTION, SOLUTION INTRAMUSCULAR; INTRAVENOUS at 08:07

## 2018-07-17 RX ADMIN — DEXTROSE 2 G: 50 INJECTION, SOLUTION INTRAVENOUS at 06:07

## 2018-07-17 RX ADMIN — ALBUMIN (HUMAN): 2.5 SOLUTION INTRAVENOUS at 07:07

## 2018-07-17 RX ADMIN — DEXTROSE 2 G: 50 INJECTION, SOLUTION INTRAVENOUS at 07:07

## 2018-07-17 RX ADMIN — SODIUM CHLORIDE, SODIUM LACTATE, POTASSIUM CHLORIDE, AND CALCIUM CHLORIDE: 600; 310; 30; 20 INJECTION, SOLUTION INTRAVENOUS at 06:07

## 2018-07-17 RX ADMIN — FENTANYL CITRATE 50 MCG: 50 INJECTION, SOLUTION INTRAMUSCULAR; INTRAVENOUS at 10:07

## 2018-07-17 RX ADMIN — EPHEDRINE SULFATE 10 MG: 50 INJECTION INTRAMUSCULAR; INTRAVENOUS; SUBCUTANEOUS at 09:07

## 2018-07-17 RX ADMIN — FENTANYL CITRATE 25 MCG: 50 INJECTION, SOLUTION INTRAMUSCULAR; INTRAVENOUS at 07:07

## 2018-07-17 RX ADMIN — MORPHINE SULFATE 2 MG: 2 INJECTION, SOLUTION INTRAMUSCULAR; INTRAVENOUS at 09:07

## 2018-07-17 RX ADMIN — PROPOFOL 200 MG: 10 INJECTION, EMULSION INTRAVENOUS at 06:07

## 2018-07-17 RX ADMIN — CHLORHEXIDINE GLUCONATE 15 ML: 1.2 RINSE ORAL at 08:07

## 2018-07-17 RX ADMIN — SODIUM CHLORIDE, SODIUM LACTATE, POTASSIUM CHLORIDE, AND CALCIUM CHLORIDE: 600; 310; 30; 20 INJECTION, SOLUTION INTRAVENOUS at 08:07

## 2018-07-17 RX ADMIN — ONDANSETRON 4 MG: 2 INJECTION INTRAMUSCULAR; INTRAVENOUS at 10:07

## 2018-07-17 RX ADMIN — GLYCOPYRROLATE 0.2 MG: 0.2 INJECTION, SOLUTION INTRAMUSCULAR; INTRAVENOUS at 07:07

## 2018-07-17 RX ADMIN — FAMOTIDINE 20 MG: 10 INJECTION, SOLUTION INTRAVENOUS at 08:07

## 2018-07-17 RX ADMIN — CEPHALEXIN 500 MG: 500 CAPSULE ORAL at 02:07

## 2018-07-17 RX ADMIN — FENTANYL CITRATE 100 MCG: 50 INJECTION, SOLUTION INTRAMUSCULAR; INTRAVENOUS at 07:07

## 2018-07-17 RX ADMIN — EPHEDRINE SULFATE 5 MG: 50 INJECTION INTRAMUSCULAR; INTRAVENOUS; SUBCUTANEOUS at 09:07

## 2018-07-17 RX ADMIN — ACETAMINOPHEN 1000 MG: 10 INJECTION, SOLUTION INTRAVENOUS at 07:07

## 2018-07-17 RX ADMIN — SODIUM CHLORIDE, SODIUM LACTATE, POTASSIUM CHLORIDE, AND CALCIUM CHLORIDE: .6; .31; .03; .02 INJECTION, SOLUTION INTRAVENOUS at 06:07

## 2018-07-17 RX ADMIN — PROPOFOL 10 MCG/KG/MIN: 10 INJECTION, EMULSION INTRAVENOUS at 08:07

## 2018-07-17 RX ADMIN — AMLODIPINE BESYLATE 5 MG: 5 TABLET ORAL at 02:07

## 2018-07-17 NOTE — PLAN OF CARE
Problem: Patient Care Overview  Goal: Plan of Care Review  Outcome: Ongoing (interventions implemented as appropriate)  Patient on room air.  Will continue to monitor.

## 2018-07-17 NOTE — OP NOTE
[facility]  Brief Operative Note     SUMMARY     Surgery Date: 7/17/2018     Surgeon(s) and Role:     * Chelsy Garcia MD - Primary     * Ciaran Gonzalez MD - Assisting        Pre-op Diagnosis:  Mass of neck [R22.1]    Post-op Diagnosis:  Post-Op Diagnosis Codes:     * Mass of neck [R22.1]    Procedure(s) (LRB):  DISSECTION, NECK, RADICAL (Right)    Anesthesia: General    Description of the findings of the procedure: right cervical lymphadenopathy    Findings/Key Components: same    Estimated Blood Loss: * No values recorded between 7/17/2018  7:48 AM and 7/17/2018 10:46 AM *         Specimens:   Specimen (12h ago through future)    Start     Ordered    07/17/18 0806  Specimen to Pathology - Surgery  Once     Comments:  1/ right neck lymph tissue      07/17/18 0806          Discharge Note    SUMMARY     Admit Date: 7/17/2018    Discharge Date and Time: tomorrow    Hospital Course (synopsis of major diagnoses, care, treatment, and services provided during the course of the hospital stay): see operative report     Final Diagnosis: Post-Op Diagnosis Codes:     * Mass of neck [R22.1]    Disposition: Admitted as an Inpatient    Follow Up/Patient Instructions:     Medications:  Reconciled Home Medications:      Medication List      ASK your doctor about these medications    AMLODIPINE ORAL  Take 5 mg by mouth once daily. Does not know dose, will bring info am of surgery     aspirin 81 MG Chew  Take 81 mg by mouth once daily.     ATORVASTATIN ORAL  Take 40 mg by mouth once daily. Will provide dose am of surgery     melatonin 5 mg Cap  Take by mouth nightly.          No discharge procedures on file.

## 2018-07-17 NOTE — TRANSFER OF CARE
"Anesthesia Transfer of Care Note    Patient: Bib Freire    Procedure(s) Performed: Procedure(s) (LRB):  DISSECTION, NECK, RADICAL (Right)    Patient location: PACU    Anesthesia Type: general    Transport from OR: Transported from OR on 2-3 L/min O2 by NC with adequate spontaneous ventilation    Post pain: adequate analgesia    Post assessment: no apparent anesthetic complications and tolerated procedure well    Post vital signs: stable    Level of consciousness: awake and alert    Nausea/Vomiting: no nausea/vomiting    Complications: none          Last vitals:   Visit Vitals  /68 (BP Location: Right arm, Patient Position: Lying)   Pulse (!) 59   Temp 36.7 °C (98.1 °F) (Oral)   Resp 18   Ht 5' 9" (1.753 m)   Wt 72.6 kg (160 lb)   SpO2 98%   BMI 23.63 kg/m²     "

## 2018-07-17 NOTE — NURSING
MD called for report of drain contents. MOHINI Billingsley in another room, MOHINI Robles went to empty drain, 15ml obtained. Site visualized, WDL.

## 2018-07-17 NOTE — OR NURSING
Room available. Wife Elsa updated on pt status and given rrojm number. States at home and will come in a little while.

## 2018-07-17 NOTE — OP NOTE
DATE OF PROCEDURE:  07/17/2018.    PREOPERATIVE DIAGNOSIS:  Right cervical lymphadenopathy.    POSTOPERATIVE DIAGNOSIS:  Right cervical lymphadenopathy    PROCEDURE PERFORMED:  Right neck dissection levels II through IV.    SURGEON:  Chelsy Garcia M.D.    ASSISTANT SURGEON:  Dr. Alex Gonzalez.    INDICATIONS FOR PROCEDURE:  This is a 62-year-old male with history of cervical   lymphadenopathy.  The patient had previous excisional lymph node biopsy, which   was indeterminate.  It was sent for testing at [facility] and still came back   as indeterminate.  Therefore, it was decided to proceed with cervical lymph node   dissection.    PROCEDURE IN DETAIL:  The patient was placed in supine position.  General   anesthesia was induced.  The patient was intubated per Anesthesia with no   difficulty.  The right neck was prepped and draped in a sterile fashion.  The   skin was injected with 1% lidocaine with 1:100,000 epinephrine.  A curvilinear   incision was made in the right neck in a natural neck crease to include the   previous incision.  This was carried down through the platysma.  Flaps were   raised subplatysmally both superiorly and inferiorly.  Next, the fascia was   taken off the sternocleidomastoid muscle.  The eleventh cranial nerve was   identified and the internal jugular vein was identified.  The digastric muscle   was then identified both anteriorly and posteriorly.  The parotid tissue was   taken off the sternocleidomastoid muscle.  Next, the dissection was carried down   to the omohyoid muscle.  The omohyoid was lifted and the cervical lymph nodes   in level 4 were pulled superiorly.  These were dissected down to the cervical   rootlets.  This was carried out to levels 3 and 2.  The cervical lymph nodes   were then dissected anteriorly off the cervical rootlets.  This was carried up   to the internal jugular vein.  The internal jugular vein was then controlled   both superiorly and inferiorly.   The lymph nodes were then removed from the   jugular vein capsule. Jugular vein tributary was identified and tied.  A tumor   was adherent to the jugular vein.  This was removed using sharp dissection.  A   small tear was noted in the jugular vein and this was repaired using Prolene   suture.  Next, the lymph nodes anteriorly were removed off the strap muscles.    These were carried towards the remaining portions of the dissection.  The   retromandibular vein was identified and ligated.  Cranial nerve XII was also   identified and preserved.  The dissections from both the anterior and posterior   parts were then connected and the lymph nodes from levels II, III and IV were   removed en bloc.  All cranial nerves in the dissection area were identified and   preserved, including the marginal mandibular nerve cranial nerves X, XI and XII   as well as the phrenic nerve.  The wound was then copiously irrigated and   hemostasis was ensured.  A 10 flat drain was then placed, a hemostatic agent was   placed into the wound.  The platysma was then closed.  The skin was then closed   using a Prolene suture and the patient was awoken from general anesthesia in   satisfactory condition and brought to Recovery Room.    BLOOD LOSS:  Approximately 75 mL.    COMPLICATIONS:  None.    CONDITION POSTOP:  Stable.      SOFIYA/IN  dd: 07/17/2018 11:18:17 (CDT)  td: 07/17/2018 14:50:21 (CDT)  Doc ID   #[id number]  Job ID #[id number]    CC:

## 2018-07-17 NOTE — NURSING
Replaced WILLIE suction bulb. Initial drain bulb 50ml clot preventing proper suction. Called MD through answering service to report and obtain new orders. MD to come assess. Recheck drain in 30 minutes and report back.

## 2018-07-17 NOTE — ANESTHESIA POSTPROCEDURE EVALUATION
"Anesthesia Post Evaluation    Patient: Bib Freire    Procedure(s) Performed: Procedure(s) (LRB):  DISSECTION, NECK, RADICAL (Right)    Final Anesthesia Type: general  Patient location during evaluation: PACU  Patient participation: Yes- Able to Participate  Level of consciousness: awake and alert  Post-procedure vital signs: reviewed and stable  Pain management: adequate  Airway patency: patent  PONV status at discharge: No PONV  Anesthetic complications: no      Cardiovascular status: blood pressure returned to baseline  Respiratory status: unassisted  Hydration status: euvolemic  Follow-up not needed.        Visit Vitals  /62   Pulse 87   Temp 37.6 °C (99.7 °F)   Resp 16   Ht 5' 9" (1.753 m)   Wt 72.6 kg (160 lb)   SpO2 (!) 93%   BMI 23.63 kg/m²       Pain/Mary Jo Score: Pain Assessment Performed: Yes (7/17/2018 11:15 AM)  Presence of Pain: denies (7/17/2018 11:15 AM)  Mary Jo Score: 10 (7/17/2018 11:15 AM)      "

## 2018-07-17 NOTE — ANESTHESIA PREPROCEDURE EVALUATION
07/17/2018  Bib Freire is a 62 y.o., male.    Anesthesia Evaluation    I have reviewed the Patient Summary Reports.    I have reviewed the Nursing Notes.   I have reviewed the Medications.     Review of Systems  Anesthesia Hx:  History of prior surgery of interest to airway management or planning: Previous anesthesia: General 5/18 LN excision with general anesthesia.  Airway issues documented on chart review include mask, easy, GETA, glidescope used  Denies Family Hx of Anesthesia complications.   Denies Personal Hx of Anesthesia complications.   Social:  Former Smoker    Hematology/Oncology:  Hematology Normal      Current/Recent Cancer. (neck mass)   Cardiovascular:   Hypertension hyperlipidemia    Pulmonary:  Pulmonary Normal    Renal/:  Renal/ Normal     Hepatic/GI:  Hepatic/GI Normal    Neurological:   Seizures (No seizure in 40 years), well controlled    Endocrine:  Endocrine Normal        Physical Exam   Airway/Jaw/Neck:  Airway Findings: Mouth Opening: Normal Tongue: Normal  General Airway Assessment: Adult  TM Distance: Normal, at least 6 cm      Dental:  Dental Findings: In tact        Mental Status:  Mental Status Findings:  Alert and Oriented, Cooperative         Anesthesia Plan  Type of Anesthesia, risks & benefits discussed:  Anesthesia Type:  general  Patient's Preference:   Intra-op Monitoring Plan: standard ASA monitors  Intra-op Monitoring Plan Comments:   Post Op Pain Control Plan:   Post Op Pain Control Plan Comments:   Induction:   IV  Beta Blocker:         Informed Consent: Patient understands risks and agrees with Anesthesia plan.  Questions answered. Anesthesia consent signed with patient.  ASA Score: 2     Day of Surgery Review of History & Physical:    H&P update referred to the surgeon.     Anesthesia Plan Notes: Pt had recent labs at [facility].  Will get results,results  inchart        Ready For Surgery From Anesthesia Perspective.

## 2018-07-18 LAB
ANION GAP SERPL CALC-SCNC: 6 MMOL/L
APTT BLDCRRT: 26.5 SEC
BASOPHILS # BLD AUTO: 0 K/UL
BASOPHILS NFR BLD: 0 %
BUN SERPL-MCNC: 13 MG/DL
CALCIUM SERPL-MCNC: 8.4 MG/DL
CHLORIDE SERPL-SCNC: 110 MMOL/L
CO2 SERPL-SCNC: 25 MMOL/L
CREAT SERPL-MCNC: 0.7 MG/DL
DIFFERENTIAL METHOD: ABNORMAL
EOSINOPHIL # BLD AUTO: 0 K/UL
EOSINOPHIL NFR BLD: 0 %
ERYTHROCYTE [DISTWIDTH] IN BLOOD BY AUTOMATED COUNT: 13.3 %
ERYTHROCYTE [DISTWIDTH] IN BLOOD BY AUTOMATED COUNT: 13.6 %
EST. GFR  (AFRICAN AMERICAN): >60 ML/MIN/1.73 M^2
EST. GFR  (NON AFRICAN AMERICAN): >60 ML/MIN/1.73 M^2
GLUCOSE SERPL-MCNC: 135 MG/DL
HCT VFR BLD AUTO: 31.2 %
HCT VFR BLD AUTO: 32.2 %
HGB BLD-MCNC: 10.6 G/DL
HGB BLD-MCNC: 11.1 G/DL
INR PPP: 0.9
LYMPHOCYTES # BLD AUTO: 0.8 K/UL
LYMPHOCYTES NFR BLD: 6.4 %
MCH RBC QN AUTO: 31 PG
MCH RBC QN AUTO: 31.6 PG
MCHC RBC AUTO-ENTMCNC: 34 G/DL
MCHC RBC AUTO-ENTMCNC: 34.5 G/DL
MCV RBC AUTO: 91 FL
MCV RBC AUTO: 92 FL
MONOCYTES # BLD AUTO: 1 K/UL
MONOCYTES NFR BLD: 8.2 %
NEUTROPHILS # BLD AUTO: 9.9 K/UL
NEUTROPHILS NFR BLD: 85.1 %
PLATELET # BLD AUTO: 157 K/UL
PLATELET # BLD AUTO: 164 K/UL
PMV BLD AUTO: 9.5 FL
PMV BLD AUTO: 9.6 FL
POTASSIUM SERPL-SCNC: 3.7 MMOL/L
PROTHROMBIN TIME: 10.6 SEC
RBC # BLD AUTO: 3.42 M/UL
RBC # BLD AUTO: 3.51 M/UL
SODIUM SERPL-SCNC: 141 MMOL/L
WBC # BLD AUTO: 10.15 K/UL
WBC # BLD AUTO: 11.64 K/UL

## 2018-07-18 PROCEDURE — 63600175 PHARM REV CODE 636 W HCPCS: Performed by: OTOLARYNGOLOGY

## 2018-07-18 PROCEDURE — 25000003 PHARM REV CODE 250: Performed by: INTERNAL MEDICINE

## 2018-07-18 PROCEDURE — 85610 PROTHROMBIN TIME: CPT

## 2018-07-18 PROCEDURE — S0028 INJECTION, FAMOTIDINE, 20 MG: HCPCS | Performed by: INTERNAL MEDICINE

## 2018-07-18 PROCEDURE — 36430 TRANSFUSION BLD/BLD COMPNT: CPT

## 2018-07-18 PROCEDURE — 85025 COMPLETE CBC W/AUTO DIFF WBC: CPT

## 2018-07-18 PROCEDURE — 85027 COMPLETE CBC AUTOMATED: CPT

## 2018-07-18 PROCEDURE — 94761 N-INVAS EAR/PLS OXIMETRY MLT: CPT

## 2018-07-18 PROCEDURE — 99223 1ST HOSP IP/OBS HIGH 75: CPT | Mod: ,,, | Performed by: INTERNAL MEDICINE

## 2018-07-18 PROCEDURE — 99900035 HC TECH TIME PER 15 MIN (STAT)

## 2018-07-18 PROCEDURE — 80048 BASIC METABOLIC PNL TOTAL CA: CPT

## 2018-07-18 PROCEDURE — 94770 HC EXHALED C02 TEST: CPT

## 2018-07-18 PROCEDURE — 25000003 PHARM REV CODE 250: Performed by: OTOLARYNGOLOGY

## 2018-07-18 PROCEDURE — 99900026 HC AIRWAY MAINTENANCE (STAT)

## 2018-07-18 PROCEDURE — 20000000 HC ICU ROOM

## 2018-07-18 PROCEDURE — 63600175 PHARM REV CODE 636 W HCPCS: Performed by: INTERNAL MEDICINE

## 2018-07-18 PROCEDURE — 94003 VENT MGMT INPAT SUBQ DAY: CPT

## 2018-07-18 PROCEDURE — 97802 MEDICAL NUTRITION INDIV IN: CPT

## 2018-07-18 PROCEDURE — 85730 THROMBOPLASTIN TIME PARTIAL: CPT

## 2018-07-18 PROCEDURE — 27000221 HC OXYGEN, UP TO 24 HOURS

## 2018-07-18 RX ORDER — DEXAMETHASONE SODIUM PHOSPHATE 4 MG/ML
8 INJECTION, SOLUTION INTRA-ARTICULAR; INTRALESIONAL; INTRAMUSCULAR; INTRAVENOUS; SOFT TISSUE ONCE
Status: COMPLETED | OUTPATIENT
Start: 2018-07-18 | End: 2018-07-18

## 2018-07-18 RX ORDER — CEFAZOLIN SODIUM 2 G/50ML
2 SOLUTION INTRAVENOUS
Status: DISCONTINUED | OUTPATIENT
Start: 2018-07-18 | End: 2018-07-19 | Stop reason: HOSPADM

## 2018-07-18 RX ADMIN — PROPOFOL 50 MCG/KG/MIN: 10 INJECTION, EMULSION INTRAVENOUS at 12:07

## 2018-07-18 RX ADMIN — BACITRACIN ZINC: 500 OINTMENT TOPICAL at 09:07

## 2018-07-18 RX ADMIN — SODIUM CHLORIDE: 0.9 INJECTION, SOLUTION INTRAVENOUS at 06:07

## 2018-07-18 RX ADMIN — BACITRACIN ZINC: 500 OINTMENT TOPICAL at 08:07

## 2018-07-18 RX ADMIN — CEFAZOLIN SODIUM 2 G: 2 SOLUTION INTRAVENOUS at 01:07

## 2018-07-18 RX ADMIN — CEFAZOLIN SODIUM 2 G: 2 SOLUTION INTRAVENOUS at 04:07

## 2018-07-18 RX ADMIN — CEFAZOLIN SODIUM 2 G: 2 SOLUTION INTRAVENOUS at 09:07

## 2018-07-18 RX ADMIN — FAMOTIDINE 20 MG: 10 INJECTION, SOLUTION INTRAVENOUS at 09:07

## 2018-07-18 RX ADMIN — BACITRACIN ZINC: 500 OINTMENT TOPICAL at 02:07

## 2018-07-18 RX ADMIN — FAMOTIDINE 20 MG: 10 INJECTION, SOLUTION INTRAVENOUS at 08:07

## 2018-07-18 RX ADMIN — MORPHINE SULFATE 2 MG: 2 INJECTION, SOLUTION INTRAMUSCULAR; INTRAVENOUS at 01:07

## 2018-07-18 RX ADMIN — PROPOFOL 40 MCG/KG/MIN: 10 INJECTION, EMULSION INTRAVENOUS at 05:07

## 2018-07-18 RX ADMIN — DEXAMETHASONE SODIUM PHOSPHATE 8 MG: 4 INJECTION, SOLUTION INTRAMUSCULAR; INTRAVENOUS at 08:07

## 2018-07-18 RX ADMIN — CHLORHEXIDINE GLUCONATE 15 ML: 1.2 RINSE ORAL at 08:07

## 2018-07-18 RX ADMIN — CHLORHEXIDINE GLUCONATE 15 ML: 1.2 RINSE ORAL at 09:07

## 2018-07-18 NOTE — TRANSFER OF CARE
"Anesthesia Transfer of Care Note    Patient: Bib Freire    Procedure(s) Performed: Procedure(s) (LRB):  EVACUATION, HEMATOMA (Right)    Patient location: ICU    Anesthesia Type: general    Transport from OR: Upon arrival to PACU/ICU, patient attached to ventilator and auscultated to confirm bilateral breath sounds and adequate TV. Transported from OR intubated on 100% O2 by AMBU with adequate controlled ventilation. Continuous ECG monitoring in transport. Continuous SpO2 monitoring in transport. Continuos invasive BP monitoring in transport    Post pain: adequate analgesia    Post assessment: no apparent anesthetic complications    Post vital signs: stable    Level of consciousness: sedated    Nausea/Vomiting: no nausea/vomiting    Complications: none    Transfer of care protocol was followedComments: Patient transferred to ICU with monitors and ambu bag; report given to ICU, RN; patient attached to vent per RT; Dr. Garcia and Dr. Silveira at bedside; VSS; first unit of blood transfusing      Last vitals:   Visit Vitals  /65 (BP Location: Left arm, Patient Position: Lying)   Pulse 89   Temp 36.2 °C (97.2 °F) (Oral)   Resp 18   Ht 5' 9" (1.753 m)   Wt 73 kg (160 lb 15 oz)   SpO2 96%   BMI 23.77 kg/m²     "

## 2018-07-18 NOTE — NURSING
Pt extubated @ 911.  VSS on 2L NC. Pt AAOx4, follows commands, talking clearly. Denies pain or SOB. Will continue to monitor.

## 2018-07-18 NOTE — PLAN OF CARE
Problem: Patient Care Overview  Goal: Plan of Care Review  Outcome: Ongoing (interventions implemented as appropriate)  Pt free of fall or injury. Extubated this morning without complication.  VSS on room air.  C/o mild pain at incision site, denies pain medication.  Denies SOB.  Normal sinus on telemetry.  AAO x 4.  Neck incision unchanged throughout shift: mildly swollen, pink, and soft.  Both WILLIE drains maintained without complication with moderate bloody drainage.  Voids per urinal; urine now light pink, Dr. Garcia aware.  Tolerated clear liquid dinner without N/V.  Pt sitting comfortably in chair, no complaints, states he feels good.  Will continue to monitor.

## 2018-07-18 NOTE — OP NOTE
[facility]  Brief Operative Note     SUMMARY     Surgery Date: 7/17/2018     Surgeon(s) and Role:     * Chelsy Garcia MD - Primary     * Ciaran Gonzalez MD    Assisting Surgeon: None    Pre-op Diagnosis:  Lymphadenopathy of right cervical region [R59.0]    Post-op Diagnosis:  Post-Op Diagnosis Codes:     * Lymphadenopathy of right cervical region [R59.0]    Procedure(s) (LRB):  EVACUATION, HEMATOMA (Right)    Anesthesia: Choice    Description of the findings of the procedure: Pt with expanding neck hematoma. Generalized oozing noted.     Findings/Key Components: Patient with oozing from multiple regions. Hematoma was evacuated. Neck was irrigated. Hemostasis was achieved.     Estimated Blood Loss: * No values recorded between 7/17/2018  6:46 PM and 7/17/2018  8:02 PM *         Specimens:   Specimen (12h ago through future)    Start     Ordered    07/17/18 0806  Specimen to Pathology - Surgery  Once     Comments:  1/ right neck lymph tissue      07/17/18 0806          Discharge Note    SUMMARY     Admit Date: 7/17/2018    Discharge Date and Time: to be determined    Hospital Course (synopsis of major diagnoses, care, treatment, and services provided during the course of the hospital stay): see operative report     Final Diagnosis: Post-Op Diagnosis Codes:     * Lymphadenopathy of right cervical region [R59.0]    Disposition: Admitted as an Inpatient    Follow Up/Patient Instructions:     Medications:  Reconciled Home Medications:      Medication List      ASK your doctor about these medications    AMLODIPINE ORAL  Take 5 mg by mouth once daily. Does not know dose, will bring info am of surgery     aspirin 81 MG Chew  Take 81 mg by mouth once daily.     ATORVASTATIN ORAL  Take 40 mg by mouth once daily. Will provide dose am of surgery     melatonin 5 mg Cap  Take by mouth nightly.          No discharge procedures on file.

## 2018-07-18 NOTE — CONSULTS
" [facility]  Adult Nutrition  Consult Note     SUMMARY       Recommendations    Recommendation/Intervention: When medically able, advance diet as tolerated to regular (texture per SLP)    Goals:   1. Meet >85% EEN and EPN within 48   2. Prevent >2% wt loss per week   3. Promote nutrition related labs wnl    Nutrition Goal Status: new  Communication of MARKO Recs: discussed on rounds    Comments: Pt is s/p neck dissection and emergent neck hematoma evacuation, POD 1, extubated this morning. Diet advanced to clears, pt states he is tolerating. Denies N/V. Endorses regular appetite PTA. Denies any recent wt loss, wt stable per chart. Pt appears well nourished.     Reason for Assessment    Reason for Assessment: consult  Interdisciplinary Rounds: attended    Nutrition Discharge Planning: d/c on regular diet    Diagnosis:   1. Mass of neck      Past Medical History:   Diagnosis Date    Hypertension     Seizures     last grand mal 1970's; last petit mal Jan 2018      Nutrition Risk Screen    Nutrition Risk Screen: no indicators present    Nutrition/Diet History    Patient Reported Diet/Restrictions/Preferences: general  Do you have any cultural, spiritual, Orthodoxy conflicts, given your current situation?: none  Use of Vitamin/Mineral/Herbal Supplements: mvi  Factors Affecting Nutritional Intake: clear liquid diet    Anthropometrics    Temp: 98.6 °F (37 °C)  Height Method: Measured  Height: 5' 9" (175.3 cm)  Height (inches): 69 in  Weight Method: Bed Scale  Weight: 77.4 kg (170 lb 10.2 oz)  Weight (lb): 170.64 lb  Ideal Body Weight (IBW), Male: 160 lb  % Ideal Body Weight, Male (lb): 100.59 lb  BMI (Calculated): 23.8  BMI Grade: 18.5-24.9 - normal    Lab/Procedures/Meds    Labs: Reviewed  Lab Results   Component Value Date     07/18/2018    K 3.7 07/18/2018     07/18/2018    CO2 25 07/18/2018    BUN 13 07/18/2018    CREATININE 0.7 07/18/2018    CALCIUM 8.4 (L) 07/18/2018    ESTGFRAFRICA >60 " 07/18/2018    EGFRNONAA >60 07/18/2018     Lab Results   Component Value Date    HCT 31.2 (L) 07/18/2018    HGB 10.6 (L) 07/18/2018     Meds: Reviewed  Scheduled Meds:   amLODIPine  5 mg Oral Daily    atorvastatin  40 mg Oral Daily    bacitracin   Topical (Top) TID    ceFAZolin 2 g/50mL Dextrose IVPB  2 g Intravenous Q8H    chlorhexidine  15 mL Mouth/Throat BID    famotidine (PF)  20 mg Intravenous BID     Physical Findings/Assessment    Overall Physical Appearance: nourished  Skin: incision(s)    Estimated/Assessed Needs    Weight Used For Calorie Calculations: 77.4 kg (170 lb 10.2 oz)  Energy Calorie Requirements (kcal): 1950  Energy Need Method: [place]-St Jeor (stress factor 1.25)  Protein Requirements: 78-93 gm/d (1-1.2 gm/kg)  Weight Used For Protein Calculations: 77.4 kg (170 lb 10.2 oz)  Fluid Requirements (mL): 1950 (or per team)  RDA Method (mL): 1950     Nutrition Prescription Ordered    Current Diet Order: clear liquid    Evaluation of Received Nutrient/Fluid Intake in last 24h    Lipid Calories (kcals): 220 kcals (propofol)  Oral Fluid (mL): 360  IV Fluid (mL): 2019  Other Fluid (mL): 531  Total Fluid Intake (mL): 2910  Fluid Required: meeting needs    % Intake of Estimated Energy Needs: 0 - 25 %  % Meal Intake: 0 - 25 %    Nutrition Risk    Level of Risk/Frequency of Follow-up: high     Assessment and Plan    Nutrition Problem  Inadequate oral intake  Related to (etiology):   Decreased ability to consume sufficient energy  Signs and Symptoms (as evidenced by):   Pt recently extubated, now on clear liquid diet  Nutrition Diagnosis Status:   New    Monitor and Evaluation    Food and Nutrient Intake: energy intake, food and beverage intake  Food and Nutrient Adminstration: diet order  Anthropometric Measurements: weight, weight change  Biochemical Data, Medical Tests and Procedures: electrolyte and renal panel, gastrointestinal profile, glucose/endocrine profile, inflammatory profile, lipid  profile  Nutrition-Focused Physical Findings: overall appearance, extremities, muscles and bones, skin     Nutrition Follow-Up    RD Follow-up?: Yes    Iliana Duong, MS, RD, LDN   Dietitian, [facility]  [phone]

## 2018-07-18 NOTE — EICU
Bedside nurse calling for vent and sedation orders.  Informed  Dr. Mendoza and he is now camera into room.

## 2018-07-18 NOTE — PLAN OF CARE
Problem: Patient Care Overview  Goal: Plan of Care Review  Outcome: Ongoing (interventions implemented as appropriate)  Pt received from surgery intubated and placed on to mechanical ventilation, ETT in place, secure and patent and pt in no acute distress. Will continue to monitor.

## 2018-07-18 NOTE — EICU
Brief new admit post op evaluation:  62 yr old male underwent evacuation of worsening neck hematoma with blood loss,  on vent.  Earlier had Rt cervical lymphadenectomy was in floor.  Called for vent management.    Camera Eval:  Discussed with bed side RN.  VS: HR 86, MAP>70, sats 100%.  On /5/12/100%.  Need sedation , blanket warmer.      Data:  No recent labs.  CxR pending post intubation in ED.    Plan:  - Decrease fio2 to keep sats around 98.  Get ABG prn  - Apply warm blanket prn  - Vent bundle ordered  - SCD/SUP ordered  - follow BMP/CBC for now.  Follow cbc in am  - Pharmacist changing Ancef order to 2 gm IV x 3 doses post op from current IM dosing.  - getting total 2 units.   Received 1.5 lit of LR in OT

## 2018-07-18 NOTE — ANESTHESIA PREPROCEDURE EVALUATION
07/17/2018  Bib Freire is a 62 y.o., male.    Pre-op Assessment    I have reviewed the Patient Summary Reports.     I have reviewed the Nursing Notes.   I have reviewed the Medications.     Review of Systems  Anesthesia Hx:  History of prior surgery of interest to airway management or planning: Previous anesthesia: General 5/18 LN excision with general anesthesia.  Airway issues documented on chart review include mask, easy, GETA, glidescope used  Denies Family Hx of Anesthesia complications.   Denies Personal Hx of Anesthesia complications.   Social:  Former Smoker    Hematology/Oncology:  Hematology Normal      Current/Recent Cancer. (neck mass)   Cardiovascular:   Hypertension hyperlipidemia    Pulmonary:  Pulmonary Normal    Renal/:  Renal/ Normal     Hepatic/GI:  Hepatic/GI Normal    Neurological:   Seizures (No seizure in 40 years), well controlled    Endocrine:  Endocrine Normal    Dermatological:   Right neck swelling        Physical Exam  General:  Well nourished    Airway/Jaw/Neck:  Airway Findings: Mouth Opening: Normal Tongue: Normal  General Airway Assessment: Adult  TM Distance: Normal, at least 6 cm      Dental:  Dental Findings: In tact        Mental Status:  Mental Status Findings:  Alert and Oriented, Cooperative         Anesthesia Plan  Type of Anesthesia, risks & benefits discussed:  Anesthesia Type:  general  Patient's Preference:   Intra-op Monitoring Plan: standard ASA monitors  Intra-op Monitoring Plan Comments:   Post Op Pain Control Plan:   Post Op Pain Control Plan Comments:   Induction:   IV  Beta Blocker:         Informed Consent: Patient understands risks and agrees with Anesthesia plan.  Questions answered. Anesthesia consent signed with patient.  ASA Score: 2  emergent   Day of Surgery Review of History & Physical:    H&P update referred to the surgeon.     Anesthesia  Plan Notes: Pt had postop neck hematoma discovered in hospital room. For emergent evacuation        Ready For Surgery From Anesthesia Perspective.

## 2018-07-18 NOTE — ANESTHESIA POSTPROCEDURE EVALUATION
"Anesthesia Post Evaluation    Patient: Bib Freire    Procedure(s) Performed: Procedure(s) (LRB):  EVACUATION, HEMATOMA (Right)    Final Anesthesia Type: general  Patient location during evaluation: ICU  Patient participation: No - Unable to Participate, Intubation  Level of consciousness: sedated  Post-procedure vital signs: reviewed and stable  Pain management: adequate  Airway patency: patent  PONV status at discharge: No PONV  Anesthetic complications: no      Cardiovascular status: blood pressure returned to baseline  Respiratory status: intubated and ventilator  Hydration status: euvolemic  Follow-up not needed.        Visit Vitals  /65 (BP Location: Left arm, Patient Position: Lying)   Pulse 89   Temp 36.2 °C (97.2 °F) (Oral)   Resp 18   Ht 5' 9" (1.753 m)   Wt 73 kg (160 lb 15 oz)   SpO2 96%   BMI 23.77 kg/m²       Pain/Mary Jo Score: Pain Assessment Performed: Yes (7/17/2018  1:19 PM)  Presence of Pain: denies (7/17/2018  1:19 PM)  Mary Jo Score: 10 (7/17/2018 12:50 PM)      "

## 2018-07-18 NOTE — NURSING
Upon olivo removal this morning, moderate amount of blood noted at end of olivo catheter.  Urine red tinged when patient using urinal.  Dr. Garcia notified at the time.  No new orders.  Urine progressively becoming less red with each void.  Will continue to monitor.

## 2018-07-18 NOTE — PROGRESS NOTES
Pt stable overnight. Remains intubated in ICU.    AF VSS  Alert, intubated.  Neck soft.  JPs to bulb suction    H/H stable    A/P: POD #1 s/p neck dissection and emergent neck hematoma evacuation.  1. Stable.  2. Plan extubate today.   3. Continue ICU care.

## 2018-07-18 NOTE — OP NOTE
DATE OF PROCEDURE:  07/17/2018.    PREOPERATIVE DIAGNOSIS:  Right neck expanding hematoma.    POSTOPERATIVE DIAGNOSIS:  Right neck expanding hematoma.    PROCEDURE PERFORMED:  Emergent right neck hematoma evacuation.    SURGEON:  Chelsy Garcia M.D.    ASSISTANT SURGEON:  Dr. Alex Gonzalez.    Indications for procedure: The patient had an expanding neck hematoma on exam with lethargy, increased work of breathing, and hypotension.     PROCEDURE NOTE IN DETAIL:  The patient was emergently brought to the floor over   to the preop holding area.  The neck was prepped.  The patient was struggling to   breathe and had a blood pressure of 70/40.   Therefore, the neck incision was opened at the bedside to relieve   pressure from the hematoma and hematoma was partially evacuated.  At this point,   anesthesia arrived.  The patient was brought to the Operating Room.  Flexible   laryngoscopy was performed.  The patient's airway was noted to be patent with   minimal edema.  The patient was then intubated per Anesthesia with no   difficulty.  The neck was then prepped and draped in sterile fashion.  The   sutures were removed.  The remaining platysmal stitches were removed.  The neck   was then copiously irrigated and hematoma was evacuated.  The patient had   generalized oozing with no other obvious source of the hematoma.  The patient   had hemostasis control via bipolar.  Again, the patient was copiously irrigated   and bleeding sources were controlled.  Valsalva was performed and the patient had no   active bleeding.  Therefore, Surgicel was placed into the wound.  Surgiflo was   also placed into the wound.  Two flat WILLIE drains were placed.  The platysma was   then closed and the skin was closed with nylon suture.  The patient was then   brought to the ICU, intubated and kept intubated overnight for airway control.    FINDINGS:  The patient had an expanding neck hematoma.  This was evacuated and   hemostasis was  controlled.  Blood loss was approximately 500 mL from the   hematoma evacuation with no further blood loss during the procedure.    COMPLICATIONS:  None.    CONDITION POSTOP:  Stable.            /ls [id number] blank(s)        BAB/HN  dd: 07/18/2018 08:19:03 (CDT)  td: 07/18/2018 08:52:02 (CDT)  Doc ID   #[id number]  Job ID #[id number]    CC:

## 2018-07-18 NOTE — CONSULTS
"Pulmonary / Critical Care Medicine  Consult Note    Primary Attending:  Chelsy Garcia MD   Primary Team: Otolaryngology   Consultant Attending: Maira Parsons MD   Consultant Fellow: Kostas Mendoza MD     Reason for Consult  "Vent management"     History of Present Illness:  Mr. Freire is a 62 year old gentleman who underwent a right neck dissection yesterday for right cervical lymphadenopathy.  His post-operative course was complicated by formation of a hematoma requiring emergent exploration and evacuation.  He was left intubated afterwards and transferred to the intensive care unit.  Since that time he has remained stable, and aside from receiving 2 units of pRBCs in the operating room, has had no evidence of ongoing blood loss.  Extubated this morning, no complaint.s     Past Medical History:   Hypertension     Seizures     last grand mal 1970's; last petit mal Jan 2018        Past Surgical History:   Excision lymph nodes Right 05/16/2018    right neck    FOOT SURGERY      HEMORRHOID SURGERY          Allergies:  No Known Allergies     Medications:   Home Medications:     amlodipine Take 5 mg by mouth once daily. Does not know dose, will bring info am of surgery     atorvastatin calcium Take 40 mg by mouth once daily. Will provide dose am of surgery          Current Medications:  Scheduled:   amlodipine  5 mg Oral Daily    atorvastatin  40 mg Oral Daily    bacitracin   Topical (Top) TID    cefazolin  2 g Intravenous Q8H    chlorhexidine  15 mL Mouth/Throat BID    famotidine  20 mg Intravenous BID       Continuous Infusions:   propofol Stopped (07/18/18 0838)       PRN:   sodium chloride, morphine, promethazine (PHENERGAN) IVPB, sodium chloride 0.9%     Social History:  Tobacco: Reports that he has quit smoking. His smoking use included Cigarettes. He started smoking about 15 years ago. He has never used smokeless tobacco.   EtOH: Drinks alcohol on a regular basis   Illicit Drugs: Former " marijuana use; no     Family History:  Mother: No known pulmonary disease   Father: No known pulmonary disease.     Review of Systems:  · Other than those symptoms mentioned above, an extensive review of systems was unremarkable.     Vital Signs   Temp:  [94.3 °F (34.6 °C)-99.7 °F (37.6 °C)]   Pulse:  [64-94]   Resp:  [12-23]   BP: ()/(51-86)   SpO2:  [93 %-100 %]   Arterial Line BP: ()/(42-78)    Physical Exam   Gen: well developed, well nourished, no distress   HEENT: lips, mucosa, and tongue normal; teeth and gums normal and; no throat erythema; conjunctivae/corneas clear. PERRL.   CVS: regular rate and rhythm, S1, S2 normal, no murmur   Chest: clear to auscultation bilaterally, normal respiratory effort and normal percussion bilaterally   Abdomen: soft, non-tender non-distended; bowel sounds normal   Ext: warm, well perfused and no cyanosis or edema, or clubbing   Skin: no rashes, no ecchymoses, no petechiae   Neuro: oriented, normal mood, grossly non-focal   Lines: IV, Day# 2  WILLIE; Day #1      Labs   Lab 07/17/18 07/18/18 07/18/18 07/18/18   WBC  11.64 10.15    RBC  3.51 3.42    HGB  11.1 10.6    HCT  32.2 31.2    PLT  164 157    MCV  92 91    MCH  31.6 31.0    MCHC  34.5 34.0    NA  141     K  3.7     CL  110     CO2  25     BUN  13     CREATININE  0.7     PH 7.489      PCO2 30.6      PO2 183      HCO3 23.2      POCSATURATED 100      BE 0      INR    0.9   APTT    26.5      Imaging CXR 07/18/2018 I personally reviewed the films and findings are:, unremarkable          Other Studies:   PFTs      None on file   Echo  None on file      Micro Blood Cx  No recent culture data   Sputum Cx  No recent culture data      Assessment/Plan:  1. S/p right neck dissection with post-operative hematoma formation.  2. Hypertension  3. History of tobacco use  4. Blood loss anemia  · Extubated this morning.  Doing well since.  · Will defer to ENT for management of post-operative issues.  · PCCM will sign off at this  time.  Please re-consult as needed.    Kostas Mendoza MD  Pulmonary / Critical Care Fellow  Cell [phone]  07/18/2018  9:10 AM

## 2018-07-18 NOTE — PLAN OF CARE
Prior to admission patient was independent with ADLs.     Patients PCP is correct on the face sheet. Patient choice pharmacy is [facility] in [place].    Patient denies a history of mental illness of substance use.     Patient will be transported home by family at discharge.     No CM needs identified for discharge.     LMSW/CM team will continue to follow.      07/18/18 5720   Discharge Assessment   Assessment Type Discharge Planning Assessment   Confirmed/corrected address and phone number on facesheet? Yes   Assessment information obtained from? Patient   Prior to hospitilization cognitive status: Alert/Oriented   Prior to hospitalization functional status: Independent   Current cognitive status: Alert/Oriented   Current Functional Status: Independent   Lives With spouse   Able to Return to Prior Arrangements yes   Is patient able to care for self after discharge? Yes   Patient's perception of discharge disposition home or selfcare   Readmission Within The Last 30 Days no previous admission in last 30 days   Patient currently being followed by outpatient case management? No   Patient currently receives any other outside agency services? No   Equipment Currently Used at Home none   Do you have any problems affording any of your prescribed medications? No   Is the patient taking medications as prescribed? yes   Does the patient have transportation home? Yes   Transportation Available family or friend will provide   Does the patient receive services at the [facility]? No   Discharge Plan A Home   Patient/Family In Agreement With Plan yes

## 2018-07-18 NOTE — PLAN OF CARE
Problem: Patient Care Overview  Goal: Plan of Care Review  Recommendation/Intervention: When medically able, advance diet as tolerated to regular (texture per SLP)     Goals:   1. Meet >85% EEN and EPN within 48   2. Prevent >2% wt loss per week   3. Promote nutrition related labs wnl

## 2018-07-18 NOTE — PROGRESS NOTES
Patient extubated to a 2 liter nasal cannula.  Patient tolerated well with no adverse reactions noted.

## 2018-07-18 NOTE — ANESTHESIA PROCEDURE NOTES
Left radial a line    Diagnosis: hypotension    Patient location during procedure: done in OR  Staffing  Anesthesiologist: JOSE ANTONIO BARRAGNA  Performed: anesthesiologist   Preanesthetic Checklist  Completed: patient identified and monitors and equipment checkedLeft radial a line  Skin Prep: chlorhexidine gluconate  Orientation: left  Location: radial  Catheter Size: 20 G  Catheter placement by Anatomical landmarks. Heme positive aspiration all ports.Insertion Attempts: 2  Assessment  Dressing: secured with tape and tegaderm  Patient: Tolerated well

## 2018-07-18 NOTE — OR NURSING
Pt to surgery without written consent . Dr Garcia discussed with wife the need to return to surgery to evacuate hematoma right neck. And immediately brought patient to OR

## 2018-07-19 VITALS
BODY MASS INDEX: 25.27 KG/M2 | SYSTOLIC BLOOD PRESSURE: 131 MMHG | DIASTOLIC BLOOD PRESSURE: 70 MMHG | RESPIRATION RATE: 17 BRPM | TEMPERATURE: 98 F | HEIGHT: 69 IN | HEART RATE: 68 BPM | OXYGEN SATURATION: 95 % | WEIGHT: 170.63 LBS

## 2018-07-19 PROCEDURE — 25000003 PHARM REV CODE 250: Performed by: OTOLARYNGOLOGY

## 2018-07-19 PROCEDURE — S0028 INJECTION, FAMOTIDINE, 20 MG: HCPCS | Performed by: INTERNAL MEDICINE

## 2018-07-19 PROCEDURE — 63600175 PHARM REV CODE 636 W HCPCS: Performed by: OTOLARYNGOLOGY

## 2018-07-19 PROCEDURE — 94761 N-INVAS EAR/PLS OXIMETRY MLT: CPT

## 2018-07-19 PROCEDURE — 25000003 PHARM REV CODE 250: Performed by: INTERNAL MEDICINE

## 2018-07-19 RX ORDER — CEPHALEXIN 500 MG/1
500 CAPSULE ORAL EVERY 8 HOURS
Qty: 30 CAPSULE | Refills: 0 | Status: ON HOLD | OUTPATIENT
Start: 2018-07-19 | End: 2020-01-27

## 2018-07-19 RX ORDER — BACITRACIN ZINC 500 UNIT/G
OINTMENT IN PACKET (EA) TOPICAL 2 TIMES DAILY
Status: ON HOLD | COMMUNITY
Start: 2018-07-19 | End: 2020-01-27

## 2018-07-19 RX ORDER — ONDANSETRON 4 MG/1
8 TABLET, ORALLY DISINTEGRATING ORAL EVERY 8 HOURS PRN
Qty: 15 TABLET | Refills: 0 | Status: ON HOLD | OUTPATIENT
Start: 2018-07-19 | End: 2020-01-27

## 2018-07-19 RX ORDER — PREDNISONE 20 MG/1
20 TABLET ORAL DAILY
Qty: 5 TABLET | Refills: 0 | Status: SHIPPED | OUTPATIENT
Start: 2018-07-19 | End: 2018-07-24

## 2018-07-19 RX ORDER — HYDROCODONE BITARTRATE AND ACETAMINOPHEN 5; 325 MG/1; MG/1
1 TABLET ORAL EVERY 4 HOURS PRN
Qty: 25 TABLET | Refills: 0 | Status: ON HOLD | OUTPATIENT
Start: 2018-07-19 | End: 2020-01-27

## 2018-07-19 RX ORDER — BACITRACIN ZINC 500 UNIT/G
OINTMENT (GRAM) TOPICAL 3 TIMES DAILY
Refills: 0 | Status: ON HOLD | COMMUNITY
Start: 2018-07-19 | End: 2020-01-27

## 2018-07-19 RX ADMIN — CEFAZOLIN SODIUM 2 G: 2 SOLUTION INTRAVENOUS at 09:07

## 2018-07-19 RX ADMIN — FAMOTIDINE 20 MG: 10 INJECTION, SOLUTION INTRAVENOUS at 09:07

## 2018-07-19 RX ADMIN — ATORVASTATIN CALCIUM 40 MG: 20 TABLET, FILM COATED ORAL at 09:07

## 2018-07-19 RX ADMIN — CEFAZOLIN SODIUM 2 G: 2 SOLUTION INTRAVENOUS at 01:07

## 2018-07-19 RX ADMIN — BACITRACIN ZINC: 500 OINTMENT TOPICAL at 09:07

## 2018-07-19 NOTE — NURSING
Patient ambulating without difficulty and tolerating PO without N/V.  Patient in NAD, VSS on room air.  Incision and drains WNL. Patient and wife educated on all discharge instructions and verbalized understanding. WILLIE drain teaching done, patient and wife verbalized and demonstrated understanding.  WILLIE drain output log provided.  All questions answered to patient's satisfaction. Both IVs removed without complication. Patient transported off unit via wheelchair.

## 2018-07-19 NOTE — PROGRESS NOTES
POD#2 s/p Neck dissection and hematoma evacuation. Tolerating po well.    AF VSS  NAD  Alert, oriented.  Neck soft. No hematoma. JPs intact to bulb suction.  CNs intact.    A/P: s/p Neck dissection and hematoma evauation.  1. Pt stable. Will d/c home this afternoon is able to ambulate and tolerating po well.   Pt will come to the office for WILLIE removal tomorrow.

## 2018-07-19 NOTE — DISCHARGE SUMMARY
[facility]  Brief Operative Note     SUMMARY     Surgery Date: 7/17/2018     Surgeon(s) and Role:     * Chelsy Garcia MD - Primary     * Ciaran Gonzalez MD    Assisting Surgeon: None    Pre-op Diagnosis:  Lymphadenopathy of right cervical region [R59.0]    Post-op Diagnosis:  Post-Op Diagnosis Codes:     * Lymphadenopathy of right cervical region [R59.0]    Procedure(s) (LRB):  EVACUATION, HEMATOMA (ADD ON ) (Right)    Anesthesia: General    Description of the findings of the procedure: See operative reports    Findings/Key Components: see operative reports    Estimated Blood Loss: * No values recorded between 7/17/2018  6:46 PM and 7/17/2018  8:02 PM *         Specimens:   Specimen (12h ago through future)    None          Discharge Note    SUMMARY     Admit Date: 7/17/2018    Discharge Date and Time:  07/19/2018 8:58 AM    Hospital Course (synopsis of major diagnoses, care, treatment, and services provided during the course of the hospital stay): Pt admitted 7/17/18 for right neck dissection. He was admitted to the floor post operatively. He was noted to have increased WILLIE output. He was checked by myself and noted to have an expanding neck hematoma. He was brought emergently back to the operating room for hematoma evacuation and hemostasis. He was admitted to the ICU and left intubated overnight. Critical care consult was obtained. He received 2 units of PRBC. He was stable overnight and extubated on POD#1. He was stable during the day and po was started and tolerated well. His labs remained stable. He was ambulated without difficulty prior to discharge. He was determined  safe for discharge on POD #2.     Final Diagnosis: Post-Op Diagnosis Codes:     * Lymphadenopathy of right cervical region [R59.0]    Disposition: Home or Self Care    Follow Up/Patient Instructions:     Medications:  Reconciled Home Medications:      Medication List      START taking these medications    *  bacitracin 500 unit/gram Oint  Apply topically 3 (three) times daily.     * bacitracin zinc 500 unit/gram Pack  Apply topically 2 (two) times daily.     cephALEXin 500 MG capsule  Commonly known as:  KEFLEX  Take 1 capsule (500 mg total) by mouth every 8 (eight) hours.     HYDROcodone-acetaminophen 5-325 mg per tablet  Commonly known as:  NORCO  Take 1 tablet by mouth every 4 (four) hours as needed for Pain.     ondansetron 4 MG Tbdl  Commonly known as:  ZOFRAN-ODT  Take 2 tablets (8 mg total) by mouth every 8 (eight) hours as needed (nausea).     predniSONE 20 MG tablet  Commonly known as:  DELTASONE  Take 1 tablet (20 mg total) by mouth once daily. for 5 days        * This list has 2 medication(s) that are the same as other medications prescribed for you. Read the directions carefully, and ask your doctor or other care provider to review them with you.            CONTINUE taking these medications    AMLODIPINE ORAL  Take 5 mg by mouth once daily. Does not know dose, will bring info am of surgery     ATORVASTATIN ORAL  Take 40 mg by mouth once daily. Will provide dose am of surgery     melatonin 5 mg Cap  Take by mouth nightly.        STOP taking these medications    aspirin 81 MG Chew            Discharge Procedure Orders  Diet general     Call MD for:  severe uncontrolled pain     Call MD for:  difficulty breathing, headache or visual disturbances     Call MD for:  redness, tenderness, or signs of infection (pain, swelling, redness, odor or green/yellow discharge around incision site)     No dressing needed     Shower on day dressing removed (No bath)       Follow-up Information     Chelsy Garcia MD In 1 day.    Specialty:  Otolaryngology  Contact information:  [address] [facility] [id number] [phone]

## 2018-07-19 NOTE — PLAN OF CARE
Patient is discharged home.     Patients wife will transport him home.    No cm needs for discharge.      07/19/18 1541   Final Note   Assessment Type Final Discharge Note   Discharge Disposition Home   What phone number can be called within the next 1-3 days to see how you are doing after discharge? [phone]

## 2018-07-19 NOTE — PLAN OF CARE
Problem: Patient Care Overview  Goal: Plan of Care Review  Outcome: Ongoing (interventions implemented as appropriate)  Patient on room air.

## 2018-07-19 NOTE — PHYSICIAN QUERY
PT Name: Bib Freire  MR #: [id number]    Physician Query Form - Respiratory Condition Clarification      CDS/: Tiffany SHIN,RN,CDI            Contact information:[phone]    This form is a permanent document in the medical record.    Query Date: July 19, 2018    By submitting this query, we are merely seeking further clarification of documentation. Please utilize your independent clinical judgment when addressing the question(s) below.    The Medical record contains the following   Indicators   Supporting Clinical Findings Location in Medical Record   x   SOB, RASMUSSEN, Wheezing, Productive Cough, Use of Accessory Muscles, etc.         The patient was struggling to         breathe.             07/18/18 OP Note   x   Acute/Chronic Illness         Right neck expanding hematoma.          PROCEDURE PERFORMED:  Emergent right neck            hematoma evacuation. The patient was struggling to         breathe.  Therefore, the neck incision was opened at          the bedside to relieve pressure from the hematoma         and hematoma was partially evacuated.         The neck was then copiously irrigated and hematoma          was evacuate                                  07/18/18 OP Note   x   Radiology Findings        FINDINGS:     Endotracheal tube is visualized with distal tip 4 cm     above the joann.  Heart is normal in size.  Lungs are     symmetrically expanded.  No evidence of new focal     consolidative process, pneumothorax, or significant     effusion.  No acute osseous abnormality identified.                          07/17/18 Chest X-Ray AP Portable   x   Respiratory Distress or Failure          63 yo man s/p neck dissection with subsequent        hematoma s/p evacuation with post-operative        respiratory failure. Passing SBT this AM and        successfully extubated to NC. Lungs CTAB                       07/18/18 Pulmonology Consult      Hypoxia or Hypercapnia        RR         ABGs          O2 sat     x   BiPAP/Intubation                  Mr. Freire is a 62 year old gentleman who underwent         a right neck dissection yesterday for right cervical         lymphadenopathy.  His post-operative course was         complicated by formation of a hematoma requiring         emergent exploration and evacuation.  He was left         intubated afterwards and transferred to the intensive         care unit.                07/18/18 Pulmonary/Critical Care Medicine             Consult Note     x   Supplemental O2        Pt extubated @ 911.  VSS on 2L NC. Pt AAOx4,        follows commands, talking clearly.                    07/18/18 Intensive Care Nurising      Home O2, Oxygen Dependence        Treatment     x   Other         Mr. Freire is a 62 year old gentleman who       underwent a right neck dissection yesterday for       right cervical lymphadenopathy.  His       post-operative course was complicated by       formation of a hematoma requiring emergent       exploration and evacuation.  He was left       intubated afterwards and transferred to the        intensive care unit.                        07/18/18 Pulmonology Consult     Respiratory failure can be acute, chronic or both. It is generally further specificed as hypoxic, hypercapnic or both. Lastly, it is important to identify an etiology, if known or suspected.   References:: https://www.acphospitalist.org/archives/2013/10/coding; htm; http://[employer].com/acute-respiratory-failure-know    The clinical guidelines noted below are only system guidelines, and do not replace the providers clinical judgment.    Provider, please specify diagnosis or diagnoses associated with above clinical findings.     [    ] Acute Respiratory Failure with Hypoxia - ABG pO2 < 60 mmHg or O2 sat of 88% on RA and respiratory symptoms documented  [    ] Acute Respiratory Failure with Hypercapnia - pCO2 > 50 mmHg with pH < 7.35 and respiratory symptoms documented  [     ] Acute Respiratory Failure with Hypoxia and Hypercapnia - Hypoxia: ABG pO2 < 60 mmHg or O2 sat of 88% on RA and Hypercapnia: pCO2 > 50 mmHg with pH < 7.35 and respiratory symptoms documented  [    ] Other Acute Respiratory Failure    [    ] Acute Respiratory Insufficiency - Generally describes less severe respiratory symptoms and measurements (pO2, SpO2, pH, and pCO2) NOT meeting criteria for respiratory failure    [  x  ] Acute Respiratory Distress - Generally describes less severe respiratory symptoms (tachypnea, in respiratory distress, increased work of breathing, unable to speak in complete sentences, labored breathing, use of accessory muscles, RR> 24, cyanosis, dyspnea, wheezing, stridor, lethargy) without sufficient measurements (pO2, SpO2, pH, and pCO2) to meet criteria for respiratory failure   [    ] No Respiratory Failure, Maintained on Vent for Routine Care or Airway Protection -  purposely intubated for airway protection (e.g.: angioedema, stroke, trauma); without meeting the criteria for respiratory failure.   [    ] Other Respiratory Diagnosis (please specify): _________________________________  [    ] Clinically Undetermined    Please document in your progress notes daily for the duration of treatment until resolved and include in your discharge summary.

## 2018-07-20 LAB
BLD PROD TYP BPU: NORMAL
BLOOD UNIT EXPIRATION DATE: NORMAL
BLOOD UNIT TYPE CODE: 5100
BLOOD UNIT TYPE: NORMAL
CODING SYSTEM: NORMAL
DISPENSE STATUS: NORMAL
FLOW CYTOMETRY ANTIBODIES ANALYZED - TISSUE: NORMAL
FLOW CYTOMETRY COMMENT - TISSUE: NORMAL
FLOW CYTOMETRY INTERPRETATION - TISSUE: NORMAL
NUM UNITS TRANS PACKED RBC: NORMAL
NUM UNITS TRANS PACKED RBC: NORMAL
SPECIMEN TYPE - TISSUE: NORMAL
TRANS ERYTHROCYTES VOL PATIENT: NORMAL ML

## 2019-12-09 ENCOUNTER — TELEPHONE (OUTPATIENT)
Dept: NEUROLOGY | Facility: HOSPITAL | Age: 64
End: 2019-12-09

## 2019-12-09 NOTE — TELEPHONE ENCOUNTER
----- Message from Jessika Campbell RN sent at 12/9/2019  4:41 PM CST -----  pls sched with Dr. Klein.    ----- Message -----  From: Reji Klein MD  Sent: 12/9/2019   2:37 PM CST  To: Misa Reese LPN, Jessika Campbell RN    Schedule for clinic to discuss (not direct schedule)     ----- Message -----  From: Jessika Campbell RN  Sent: 12/9/2019   1:34 PM CST  To: Reji Klein MD, Latoya Mendoza Staff    Spoke with pt, he is calling about scheduling his colonscopy.  He is not having any problems but he says he has this annually.  Msg sent to Dr. Klein for direction on appointment or direct schedule.     Please advise.

## 2019-12-09 NOTE — TELEPHONE ENCOUNTER
----- Message from Polly Lamas sent at 12/9/2019 11:08 AM CST -----  Contact: Doe (wife)/[phone]  New patient - Referred by: Dr. Escoto    Why do you need to be seen (diagnosis)? Lymphoma    Which doctor are you requesting? Dr. Klein    You may contact the patient back to schedule at:  [phone]

## 2019-12-09 NOTE — TELEPHONE ENCOUNTER
Spoke with pt, he is calling about scheduling his colonscopy.  He is not having any problems but he says he has this annually.  Msg sent to Dr. Klein for direction on appointment or direct schedule.

## 2019-12-09 NOTE — TELEPHONE ENCOUNTER
Clinic appt scheduled on Wednesday, December 11, 2019 at 115pm.  Pt given clinic address and repeated correctly.

## 2019-12-11 ENCOUNTER — OFFICE VISIT (OUTPATIENT)
Dept: NEUROLOGY | Facility: HOSPITAL | Age: 64
End: 2019-12-11
Attending: INTERNAL MEDICINE
Payer: MEDICARE

## 2019-12-11 VITALS
BODY MASS INDEX: 24.23 KG/M2 | SYSTOLIC BLOOD PRESSURE: 110 MMHG | HEART RATE: 73 BPM | HEIGHT: 69 IN | TEMPERATURE: 99 F | DIASTOLIC BLOOD PRESSURE: 68 MMHG | WEIGHT: 163.56 LBS

## 2019-12-11 DIAGNOSIS — Z12.11 COLON CANCER SCREENING: Primary | ICD-10-CM

## 2019-12-11 PROCEDURE — 99214 OFFICE O/P EST MOD 30 MIN: CPT | Performed by: INTERNAL MEDICINE

## 2019-12-11 RX ORDER — SODIUM, POTASSIUM,MAG SULFATES 17.5-3.13G
1 SOLUTION, RECONSTITUTED, ORAL ORAL ONCE
Qty: 1 BOTTLE | Refills: 0 | Status: SHIPPED | OUTPATIENT
Start: 2019-12-11 | End: 2019-12-11

## 2019-12-11 NOTE — PROGRESS NOTES
"[facility]    CC: history of colon polyps     HPI 64 y.o. 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 Patient's last colonoscopy was in January 2019, which was notable for 6 adenomas without dysplasia. He was recommended to repeat colonoscopy in 1 year. He denies abdominal pain, blood in stool, unintentional weight loss and nausea/vomiting/diarrhea.    Past Medical History  Past Medical History:   Diagnosis Date    Hypertension     Seizures     last grand mal 1970's; last petit mal Jan 2018         Review of Systems  General ROS: negative for chills, fever or weight loss  Cardiovascular ROS: no chest pain or dyspnea on exertion  Gastrointestinal ROS: no abdominal pain, change in bowel habits, or black/ bloody stools    Physical Examination  /68 (BP Location: Right arm, Patient Position: Sitting, BP Method: Medium (Automatic))   Pulse 73   Temp 99 °F (37.2 °C) (Oral)   Ht 5' 9" (1.753 m)   Wt 74.2 kg (163 lb 9.3 oz)   BMI 24.16 kg/m²   General appearance: alert, cooperative, no distress  HENT: Normocephalic, atraumatic, neck symmetrical, no nasal discharge   Lungs: clear to auscultation bilaterally, no dullness to percussion bilaterally  Heart: regular rate and rhythm without rub; no displacement of the PMI   Abdomen: soft, non-tender; bowel sounds normoactive; no organomegaly  Extremities: extremities symmetric; no clubbing, cyanosis, or edema  Neurologic: Alert and oriented X 3, normal strength, normal coordination and gait    Labs:  Lab Results   Component Value Date    WBC 10.15 07/18/2018    HGB 10.6 (L) 07/18/2018    HCT 31.2 (L) 07/18/2018    MCV 91 07/18/2018     07/18/2018     Imaging:  CT abdomen 6/18 -- 0.6 cm pulmonary nodule in the right lung base.    No morphologically abnormal nodes throughout the abdomen and pelvis.    I have " personally reviewed and interpreted these images.    Assessment:   64 yoM with pmh MALT of head/neck presents for colon polyp surveillance. Records indicate patient has history of multiple adenomas including serrated polyps and HGD. Plan for colonoscopy.    Plan:  -colonoscopy with MAC  -suprep rx sent to pharmacy    Reji Klein MD   [address] [place] [address], [address]   [place] [address] [phone]

## 2019-12-11 NOTE — PATIENT INSTRUCTIONS
SUPREP Instructions    You are scheduled for a colonoscopy with Dr. Klein on Monday, January 27, 2020 at [facility] 1st floor Admit  To ensure that your test is accurate and complete, you MUST follow these instructions listed below.  If you have any questions, please call our office at [phone].  Plan on being at the hospital for your procedure for 3-4 hours.    1.  Follow a CLEAR LIQUID DIET for the entire day before your scheduled colonoscopy.  This means no solid food the entire day starting when you wake.  You may have as much of the clear liquids as you want throughout the day.   CLEAR LIQUID DIET:   - Avoid Red, Orange, Purple, and/or Blue food coloring   - NO DAIRY   - You can have:  Coffee with sugar (no creamer), tea, water, soda, apple or white grape juice, chicken or beef broth/bouillon (no meat, noodles, or veggies), green/yellow popsicles, green/yellow Jell-O, lemonade.    2.  AT 5 pm the evening before your colonoscopy, POUR ONE (1) BOTTLE OF SUPREP INTO THE MIXING CONTAINER, PROVIDED INSIDE THE BOX.  ADD WATER TO THE LINE ON THE CONTAINER AND MIX IT WELL.  DRINK THE ENTIRE CONTAINER AND THEN DRINK TWO (2) MORE CONTAINERS OF WATER OVER THE NEXT 1 HOUR.  This is sometimes easier to drink if this solution is cold, so you can mix the solution 20 minutes ahead of time and place in the refrigerator prior to drinking.  You have to drink the solution within 30-45 minutes of mixing it.  Do NOT put this solution over ice.  It IS ok to drink with a straw.    3.  The endoscopy department will call you 1 day before your colonoscopy to tell you the exact time to arrive, AND to tell you the exact time to drink the 2nd portion of your prep (which will be FIVE HOURS BEFORE YOUR ARRIVAL TIME).  At this time given to you, POUR ONE (1) BOTTLE OF SUPREP INTO THE MIXING CONTAINER, PROVIDED INSIDE THE BOX.  ADD WATER TO THE LINE ON THE CONTAINER AND MIX IT WELL.  DRINK THE ENTIRE CONTAINER AND THEN DRINK  TWO (2) MORE CONTAINERS OF WATER OVER THE NEXT 1 HOUR.  This is sometimes easier to drink if this solution is cold, so you can mix the solution 20 minutes ahead of time and place in the refrigerator prior to drinking.  You have to drink the solution within 30-45 minutes of mixing it.  Do NOT put this solution over ice.  It IS ok to drink with a straw.  Once this is complete, you may not have ANYTHING else by mouth!    4.  You must have someone with you to DRIVE YOU HOME since you will be receiving IV sedation for the colonoscopy.    5.  It is ok to take your heart, blood pressure, and seizure medications in the morning of your test with a SIP of water.  Hold other medications until after your procedure.  Do NOT have anything else to eat or drink the morning of your colonoscopy.  It is ok to brush your teeth.    6.  If you are on blood thinners THAT YOU HAVE BEEN INSTRUCTED TO HOLD BY YOUR DOCTOR FOR THIS PROCEDURE, then do NOT take this the morning of your colonoscopy.  Do NOT stop these medications on your own, they must be approved to be held by your doctor.  Your colonoscopy can NOT be done if you are on these medications.  Examples of blood thinners include: Coumadin, Aggrenox, Plavix, Pradaxa, Reapro, Pletal, Xarelto, Ticagrelor, Brilinta, Eliquis, and high dose aspirin (325 mg).  You do not have to stop baby aspirin 81 mg.    7.  IF YOU ARE DIABETIC:  NO INSULIN OR ORAL MEDICATIONS THE MORNING OF THE COLONOSCOPY.  TAKE ONLY HALF THE DOSE OF YOUR INSULIN THE DAY BEFORE THE COLONOSCOPY.  DO NOT TAKE ANY ORAL DIABETIC MEDICATIONS THE DAY BEFORE THE COLONOSCOPY.  IF YOU ARE AN INSULIN DEPENDENT DIABETIC WITH UNSTABLE BLOOD SUGARS, NOTIFY YOUR PRIMARY CARE PHYSICIAN FOR INSTRUCTIONS.

## 2020-01-23 ENCOUNTER — TELEPHONE (OUTPATIENT)
Dept: ENDOSCOPY | Facility: HOSPITAL | Age: 65
End: 2020-01-23

## 2020-01-23 NOTE — TELEPHONE ENCOUNTER
Spoke with patient about arrival time @ 7.     Prep instructions reviewed: the day before the procedure, follow a clear liquid diet all day, then start the first 1/2 of prep at 5pm and take 2nd 1/2 of prep @ 1.  Pt must be completely NPO when prep completed @ 2.              Medications: Do not take Insulin or oral diabetic medications the day of the procedure.  Take as prescribed: heart, seizure and blood pressure medication in the morning with a sip of water (less than an ounce).  Take any breathing medications and bring inhalers to hospital with you Leave all valuables and jewelry at home.     Wear comfortable clothes to procedure to change into hospital gown You cannot drive for 24 hours after your procedure because you will receive sedation for your procedure to make you comfortable.  A ride must be provided at discharge.

## 2020-01-27 ENCOUNTER — ANESTHESIA (OUTPATIENT)
Dept: ENDOSCOPY | Facility: HOSPITAL | Age: 65
End: 2020-01-27
Payer: MEDICARE

## 2020-01-27 ENCOUNTER — ANESTHESIA EVENT (OUTPATIENT)
Dept: ENDOSCOPY | Facility: HOSPITAL | Age: 65
End: 2020-01-27
Payer: MEDICARE

## 2020-01-27 ENCOUNTER — HOSPITAL ENCOUNTER (OUTPATIENT)
Facility: HOSPITAL | Age: 65
Discharge: HOME | End: 2020-01-27
Attending: INTERNAL MEDICINE | Admitting: INTERNAL MEDICINE
Payer: MEDICARE

## 2020-01-27 VITALS
RESPIRATION RATE: 18 BRPM | HEIGHT: 69 IN | OXYGEN SATURATION: 100 % | TEMPERATURE: 97 F | SYSTOLIC BLOOD PRESSURE: 130 MMHG | WEIGHT: 160 LBS | HEART RATE: 73 BPM | BODY MASS INDEX: 23.7 KG/M2 | DIASTOLIC BLOOD PRESSURE: 74 MMHG

## 2020-01-27 DIAGNOSIS — Z86.010 HISTORY OF COLON POLYPS: Primary | ICD-10-CM

## 2020-01-27 DIAGNOSIS — K63.5 POLYP OF COLON, UNSPECIFIED PART OF COLON, UNSPECIFIED TYPE: ICD-10-CM

## 2020-01-27 PROCEDURE — 45381 COLONOSCOPY SUBMUCOUS NJX: CPT | Performed by: INTERNAL MEDICINE

## 2020-01-27 PROCEDURE — 88305 TISSUE EXAM BY PATHOLOGIST: CPT | Mod: 26,,, | Performed by: PATHOLOGY

## 2020-01-27 PROCEDURE — 63600175 PHARM REV CODE 636 W HCPCS: Performed by: INTERNAL MEDICINE

## 2020-01-27 PROCEDURE — 63600175 PHARM REV CODE 636 W HCPCS: Performed by: NURSE ANESTHETIST, CERTIFIED REGISTERED

## 2020-01-27 PROCEDURE — 88305 TISSUE EXAM BY PATHOLOGIST: CPT | Performed by: PATHOLOGY

## 2020-01-27 PROCEDURE — 25000003 PHARM REV CODE 250: Performed by: NURSE ANESTHETIST, CERTIFIED REGISTERED

## 2020-01-27 PROCEDURE — 27201012 HC FORCEPS, HOT/COLD, DISP: Performed by: INTERNAL MEDICINE

## 2020-01-27 PROCEDURE — 88305 TISSUE EXAM BY PATHOLOGIST: ICD-10-PCS | Mod: 26,,, | Performed by: PATHOLOGY

## 2020-01-27 PROCEDURE — 27201089 HC SNARE, DISP (ANY): Performed by: INTERNAL MEDICINE

## 2020-01-27 PROCEDURE — 37000009 HC ANESTHESIA EA ADD 15 MINS: Performed by: INTERNAL MEDICINE

## 2020-01-27 PROCEDURE — 37000008 HC ANESTHESIA 1ST 15 MINUTES: Performed by: INTERNAL MEDICINE

## 2020-01-27 PROCEDURE — 45380 COLONOSCOPY AND BIOPSY: CPT | Performed by: INTERNAL MEDICINE

## 2020-01-27 PROCEDURE — 45385 COLONOSCOPY W/LESION REMOVAL: CPT | Performed by: INTERNAL MEDICINE

## 2020-01-27 PROCEDURE — 27201028 HC NEEDLE, SCLERO: Performed by: INTERNAL MEDICINE

## 2020-01-27 RX ORDER — SODIUM CHLORIDE 0.9 % (FLUSH) 0.9 %
10 SYRINGE (ML) INJECTION
Status: DISCONTINUED | OUTPATIENT
Start: 2020-01-27 | End: 2020-01-27 | Stop reason: HOSPADM

## 2020-01-27 RX ORDER — LIDOCAINE HCL/PF 100 MG/5ML
SYRINGE (ML) INTRAVENOUS
Status: DISCONTINUED | OUTPATIENT
Start: 2020-01-27 | End: 2020-01-27

## 2020-01-27 RX ORDER — PROPOFOL 10 MG/ML
VIAL (ML) INTRAVENOUS
Status: DISCONTINUED | OUTPATIENT
Start: 2020-01-27 | End: 2020-01-27

## 2020-01-27 RX ORDER — SODIUM CHLORIDE 9 MG/ML
INJECTION, SOLUTION INTRAVENOUS CONTINUOUS
Status: DISCONTINUED | OUTPATIENT
Start: 2020-01-27 | End: 2020-01-27 | Stop reason: HOSPADM

## 2020-01-27 RX ORDER — PROPOFOL 10 MG/ML
VIAL (ML) INTRAVENOUS CONTINUOUS PRN
Status: DISCONTINUED | OUTPATIENT
Start: 2020-01-27 | End: 2020-01-27

## 2020-01-27 RX ORDER — DEXTROMETHORPHAN/PSEUDOEPHED 2.5-7.5/.8
DROPS ORAL
Status: COMPLETED | OUTPATIENT
Start: 2020-01-27 | End: 2020-01-27

## 2020-01-27 RX ADMIN — PROPOFOL 150 MCG/KG/MIN: 10 INJECTION, EMULSION INTRAVENOUS at 08:01

## 2020-01-27 RX ADMIN — SIMETHICONE 66 MG: 20 SUSPENSION/ DROPS ORAL at 08:01

## 2020-01-27 RX ADMIN — Medication 40 MG: at 08:01

## 2020-01-27 RX ADMIN — SODIUM CHLORIDE: 0.9 INJECTION, SOLUTION INTRAVENOUS at 07:01

## 2020-01-27 RX ADMIN — PROPOFOL 70 MG: 10 INJECTION, EMULSION INTRAVENOUS at 08:01

## 2020-01-27 RX ADMIN — PROPOFOL 10 MG: 10 INJECTION, EMULSION INTRAVENOUS at 08:01

## 2020-01-27 NOTE — ANESTHESIA POSTPROCEDURE EVALUATION
Anesthesia Post Evaluation    Patient: Bib Freire    Procedure(s) Performed: Procedure(s) (LRB):  COLONOSCOPY (N/A)    Final Anesthesia Type: MAC    Patient location during evaluation: GI PACU  Patient participation: Yes- Able to Participate  Level of consciousness: awake and alert  Post-procedure vital signs: reviewed and stable  Pain management: adequate  Airway patency: patent    PONV status at discharge: No PONV  Anesthetic complications: no      Cardiovascular status: hemodynamically stable  Respiratory status: unassisted, spontaneous ventilation and room air  Hydration status: euvolemic  Follow-up not needed.          Vitals Value Taken Time   /71 1/27/2020  7:23 AM   Temp 36.4 °C (97.5 °F) 1/27/2020  7:23 AM   Pulse 74 1/27/2020  7:23 AM   Resp 18 1/27/2020  7:23 AM   SpO2 99 % 1/27/2020  7:23 AM         No case tracking events are documented in the log.      Pain/Mary Jo Score: No data recorded

## 2020-01-27 NOTE — ANESTHESIA PREPROCEDURE EVALUATION
01/27/2020  Bib Freire is a 64 y.o., male here for c-scope under mac    Hypotension with last c-scope requiring 25mg ephedrine, 300mcg kanwal    Past Medical History:   Diagnosis Date    Hypertension     Seizures     last grand mal 1970's; last petit mal Jan 2018     Past Surgical History:   Procedure Laterality Date    EVACUATION OF HEMATOMA Right 7/17/2018    Procedure: EVACUATION, HEMATOMA (ADD ON );  Surgeon: Chelsy Garcia MD;  Location: [facility] OR;  Service: ENT;  Laterality: Right;  (ADD ON )    Excision lymph nodes Right 05/16/2018    right neck    FOOT SURGERY      HEMORRHOID SURGERY      RADICAL NECK DISSECTION Right 7/17/2018    Procedure: DISSECTION, NECK, RADICAL;  Surgeon: Chelsy Garcia MD;  Location: [facility] OR;  Service: ENT;  Laterality: Right;             Anesthesia Evaluation    I have reviewed the Patient Summary Reports.    I have reviewed the Nursing Notes.   I have reviewed the Medications.     Review of Systems  Anesthesia Hx:  History of prior surgery of interest to airway management or planning: Previous anesthesia: General 5/18 LN excision with general anesthesia.  Airway issues documented on chart review include mask, easy, GETA, glidescope used  Denies Family Hx of Anesthesia complications.   Denies Personal Hx of Anesthesia complications.   Social:  Former Smoker    Hematology/Oncology:  Hematology Normal      Current/Recent Cancer. (neck mass) Oncology Comments: S/p radiation    Cardiovascular:   Hypertension hyperlipidemia    Pulmonary:  Pulmonary Normal    Renal/:  Renal/ Normal     Hepatic/GI:  Hepatic/GI Normal    Neurological:   Seizures (No seizure in 40 years), well controlled    Endocrine:  Endocrine Normal    Dermatological:   Right neck swelling        Physical Exam  General:  Well nourished    Airway/Jaw/Neck:  Airway Findings: Mouth Opening:  Normal Tongue: Normal  General Airway Assessment: Adult  TM Distance: Normal, at least 6 cm      Dental:  Dental Findings: In tact        Mental Status:  Mental Status Findings:  Alert and Oriented, Cooperative         Anesthesia Plan  Type of Anesthesia, risks & benefits discussed:  Anesthesia Type:  MAC  Patient's Preference:   Intra-op Monitoring Plan: standard ASA monitors  Intra-op Monitoring Plan Comments:   Post Op Pain Control Plan:   Post Op Pain Control Plan Comments:   Induction:   IV  Beta Blocker:         Informed Consent: Patient understands risks and agrees with Anesthesia plan.  Questions answered. Anesthesia consent signed with patient.  ASA Score: 2     Day of Surgery Review of History & Physical:    H&P update referred to the surgeon.         Ready For Surgery From Anesthesia Perspective.

## 2020-01-27 NOTE — [employer] PATIENT INSTRUCTIONS
Discharge Summary/Instructions after an Endoscopic Procedure  Patient Name: Bib Freire  Patient MRN: [id number]  Patient YOB: 1955 Monday, January 27, 2020  Reji Klein MD  RESTRICTIONS:  During your procedure today, you received medications for sedation.  These   medications may affect your judgment, balance and coordination.  Therefore,   for 24 hours, you have the following restrictions:   - DO NOT drive a car, operate machinery, make legal/financial decisions,   sign important papers or drink alcohol.    ACTIVITY:  Today: no heavy lifting, straining or running due to procedural   sedation/anesthesia.  The following day: return to full activity including work.  DIET:  Eat and drink normally unless instructed otherwise.     TREATMENT FOR COMMON SIDE EFFECTS:  - Mild abdominal pain, nausea, belching, bloating or excessive gas:  rest,   eat lightly and use a heating pad.  - Sore Throat: treat with throat lozenges and/or gargle with warm salt   water.  - Because air was used during the procedure, expelling large amounts of air   from your rectum or belching is normal.  - If a bowel prep was taken, you may not have a bowel movement for 1-3 days.    This is normal.  SYMPTOMS TO WATCH FOR AND REPORT TO YOUR PHYSICIAN:  1. Abdominal pain or bloating, other than gas cramps.  2. Chest pain.  3. Back pain.  4. Signs of infection such as: chills or fever occurring within 24 hours   after the procedure.  5. Rectal bleeding, which would show as bright red, maroon, or black stools.   (A tablespoon of blood from the rectum is not serious, especially if   hemorrhoids are present.)  6. Vomiting.  7. Weakness or dizziness.  GO DIRECTLY TO THE NEAREST EMERGENCY ROOM IF YOU HAVE ANY OF THE FOLLOWING:      Difficulty breathing              Chills and/or fever over 101 F   Persistent vomiting and/or vomiting blood   Severe abdominal pain   Severe chest pain   Black, tarry stools   Bleeding- more than one  tablespoon   Any other symptom or condition that you feel may need urgent attention  Your doctor recommends these additional instructions:  If any biopsies were taken, your doctors clinic will contact you in 1 to 2   weeks with any results.  Follow up pathology results. Repeat colonoscopy in 1 year. If anal skin tag   has dysplasia then refer to colorectal surgery for further management.   Discharge to home   Condition stable   Resume previous diet   The signs and symptoms of potential delayed complications were discussed   with the patient. If signs or symptoms of these complications develop, call   the [facility] at [phone].   Return to normal activities tomorrow.  Written discharge instructions were   provided to the patient.   For questions, problems or results please call your physician - Reji Klein MD at Work:  [phone].  EMERGENCY PHONE NUMBER: [phone],  LAB RESULTS: [phone]  IF A COMPLICATION OR EMERGENCY SITUATION ARISES AND YOU ARE UNABLE TO REACH   YOUR PHYSICIAN - GO DIRECTLY TO THE EMERGENCY ROOM.  MD Reji Zacarias MD  1/27/2020 9:51:06 AM  This report has been verified and signed electronically.  [employer]

## 2020-01-27 NOTE — DISCHARGE INSTRUCTIONS

## 2020-01-27 NOTE — H&P
"[facility]    CC: history of colon polyps     HPI 64 y.o. 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 Patient's last colonoscopy was in January 2019, which was notable for 6 adenomas without dysplasia. He was recommended to repeat colonoscopy in 1 year. He denies abdominal pain, blood in stool, unintentional weight loss and nausea/vomiting/diarrhea.    Past Medical History  Past Medical History:   Diagnosis Date    Hypertension     Lymphoma     Seizures     last grand mal 1970's; last petit mal Jan 2018         Review of Systems  General ROS: negative for chills, fever or weight loss  Cardiovascular ROS: no chest pain or dyspnea on exertion  Gastrointestinal ROS: no abdominal pain, change in bowel habits, or black/ bloody stools    Physical Examination  BP (!) 143/71   Pulse 74   Temp 97.5 °F (36.4 °C)   Resp 18   Ht 5' 9" (1.753 m)   Wt 72.6 kg (160 lb)   SpO2 99%   BMI 23.63 kg/m²   General appearance: alert, cooperative, no distress  HENT: Normocephalic, atraumatic, neck symmetrical, no nasal discharge   Lungs: clear to auscultation bilaterally, no dullness to percussion bilaterally  Heart: regular rate and rhythm without rub; no displacement of the PMI   Abdomen: soft, non-tender; bowel sounds normoactive; no organomegaly  Extremities: extremities symmetric; no clubbing, cyanosis, or edema  Neurologic: Alert and oriented X 3, normal strength, normal coordination and gait    Labs:  Lab Results   Component Value Date    WBC 10.15 07/18/2018    HGB 10.6 (L) 07/18/2018    HCT 31.2 (L) 07/18/2018    MCV 91 07/18/2018     07/18/2018     Imaging:  CT abdomen 6/18 -- 0.6 cm pulmonary nodule in the right lung base.    No morphologically abnormal nodes throughout the abdomen and pelvis.    I have personally reviewed and interpreted these " images.    Assessment:   64 yoM with pmh MALT of head/neck presents for colon polyp surveillance. Records indicate patient has history of multiple adenomas including serrated polyps and HGD. Plan for colonoscopy.    Plan:  -colonoscopy today

## 2020-01-27 NOTE — TRANSFER OF CARE
"Anesthesia Transfer of Care Note    Patient: Bib Freire    Procedure(s) Performed: Procedure(s) (LRB):  COLONOSCOPY (N/A)    Patient location: GI    Anesthesia Type: MAC    Transport from OR: Transported from OR on room air with adequate spontaneous ventilation    Post pain: adequate analgesia    Post assessment: no apparent anesthetic complications and tolerated procedure well    Post vital signs: stable    Level of consciousness: responds to stimulation and sedated    Nausea/Vomiting: no nausea/vomiting    Complications: none    Transfer of care protocol was followed      Last vitals:   Visit Vitals  BP (!) 143/71   Pulse 74   Temp 36.4 °C (97.5 °F)   Resp 18   Ht 5' 9" (1.753 m)   Wt 72.6 kg (160 lb)   SpO2 99%   BMI 23.63 kg/m²     "

## 2020-02-06 LAB
FINAL PATHOLOGIC DIAGNOSIS: NORMAL
GROSS: NORMAL

## 2020-02-10 ENCOUNTER — TELEPHONE (OUTPATIENT)
Dept: GASTROENTEROLOGY | Facility: HOSPITAL | Age: 65
End: 2020-02-10

## 2021-01-11 ENCOUNTER — TELEPHONE (OUTPATIENT)
Dept: NEUROLOGY | Facility: HOSPITAL | Age: 66
End: 2021-01-11

## 2021-01-11 DIAGNOSIS — Z01.818 PRE-OP TESTING: ICD-10-CM

## 2021-01-11 DIAGNOSIS — Z86.010 HISTORY OF COLON POLYPS: Primary | ICD-10-CM

## 2021-03-01 ENCOUNTER — HOSPITAL ENCOUNTER (OUTPATIENT)
Dept: PREADMISSION TESTING | Facility: OTHER | Age: 66
Discharge: HOME | End: 2021-03-01
Attending: ANESTHESIOLOGY
Payer: MEDICARE

## 2021-03-01 DIAGNOSIS — Z01.818 PRE-OP TESTING: ICD-10-CM

## 2021-03-01 LAB — SARS-COV-2 RNA RESP QL NAA+PROBE: NOT DETECTED

## 2021-03-01 PROCEDURE — U0005 INFEC AGEN DETEC AMPLI PROBE: HCPCS

## 2021-03-01 PROCEDURE — U0003 INFECTIOUS AGENT DETECTION BY NUCLEIC ACID (DNA OR RNA); SEVERE ACUTE RESPIRATORY SYNDROME CORONAVIRUS 2 (SARS-COV-2) (CORONAVIRUS DISEASE [COVID-19]), AMPLIFIED PROBE TECHNIQUE, MAKING USE OF HIGH THROUGHPUT TECHNOLOGIES AS DESCRIBED BY CMS-2020-01-R: HCPCS

## 2021-03-02 ENCOUNTER — TELEPHONE (OUTPATIENT)
Dept: ENDOSCOPY | Facility: HOSPITAL | Age: 66
End: 2021-03-02

## 2021-03-03 ENCOUNTER — TELEPHONE (OUTPATIENT)
Dept: ENDOSCOPY | Facility: HOSPITAL | Age: 66
End: 2021-03-03

## 2021-03-04 ENCOUNTER — ANESTHESIA EVENT (OUTPATIENT)
Dept: ENDOSCOPY | Facility: HOSPITAL | Age: 66
End: 2021-03-04
Payer: MEDICARE

## 2021-03-04 ENCOUNTER — HOSPITAL ENCOUNTER (OUTPATIENT)
Facility: HOSPITAL | Age: 66
Discharge: HOME | End: 2021-03-04
Attending: INTERNAL MEDICINE | Admitting: INTERNAL MEDICINE
Payer: MEDICARE

## 2021-03-04 ENCOUNTER — ANESTHESIA (OUTPATIENT)
Dept: ENDOSCOPY | Facility: HOSPITAL | Age: 66
End: 2021-03-04
Payer: MEDICARE

## 2021-03-04 VITALS
HEART RATE: 66 BPM | SYSTOLIC BLOOD PRESSURE: 128 MMHG | RESPIRATION RATE: 16 BRPM | WEIGHT: 160 LBS | BODY MASS INDEX: 23.7 KG/M2 | DIASTOLIC BLOOD PRESSURE: 76 MMHG | HEIGHT: 69 IN | OXYGEN SATURATION: 100 % | TEMPERATURE: 97 F

## 2021-03-04 DIAGNOSIS — K63.5 POLYP OF COLON, UNSPECIFIED PART OF COLON, UNSPECIFIED TYPE: ICD-10-CM

## 2021-03-04 DIAGNOSIS — Z86.010 HISTORY OF COLON POLYPS: ICD-10-CM

## 2021-03-04 PROCEDURE — 88305 TISSUE EXAM BY PATHOLOGIST: CPT | Mod: 26,,, | Performed by: PATHOLOGY

## 2021-03-04 PROCEDURE — 27201012 HC FORCEPS, HOT/COLD, DISP: Performed by: INTERNAL MEDICINE

## 2021-03-04 PROCEDURE — 88305 TISSUE EXAM BY PATHOLOGIST: ICD-10-PCS | Mod: 26,,, | Performed by: PATHOLOGY

## 2021-03-04 PROCEDURE — 45385 COLONOSCOPY W/LESION REMOVAL: CPT | Performed by: INTERNAL MEDICINE

## 2021-03-04 PROCEDURE — 37000009 HC ANESTHESIA EA ADD 15 MINS: Performed by: INTERNAL MEDICINE

## 2021-03-04 PROCEDURE — 25000003 PHARM REV CODE 250: Performed by: INTERNAL MEDICINE

## 2021-03-04 PROCEDURE — 63600175 PHARM REV CODE 636 W HCPCS: Performed by: NURSE ANESTHETIST, CERTIFIED REGISTERED

## 2021-03-04 PROCEDURE — 45380 COLONOSCOPY AND BIOPSY: CPT | Performed by: INTERNAL MEDICINE

## 2021-03-04 PROCEDURE — 25000003 PHARM REV CODE 250: Performed by: NURSE ANESTHETIST, CERTIFIED REGISTERED

## 2021-03-04 PROCEDURE — 27201089 HC SNARE, DISP (ANY): Performed by: INTERNAL MEDICINE

## 2021-03-04 PROCEDURE — 88305 TISSUE EXAM BY PATHOLOGIST: CPT | Performed by: PATHOLOGY

## 2021-03-04 PROCEDURE — 37000008 HC ANESTHESIA 1ST 15 MINUTES: Performed by: INTERNAL MEDICINE

## 2021-03-04 RX ORDER — LIDOCAINE HYDROCHLORIDE 20 MG/ML
INJECTION INTRAVENOUS
Status: DISCONTINUED | OUTPATIENT
Start: 2021-03-04 | End: 2021-03-04

## 2021-03-04 RX ORDER — PROPOFOL 10 MG/ML
VIAL (ML) INTRAVENOUS CONTINUOUS PRN
Status: DISCONTINUED | OUTPATIENT
Start: 2021-03-04 | End: 2021-03-04

## 2021-03-04 RX ORDER — PROPOFOL 10 MG/ML
VIAL (ML) INTRAVENOUS
Status: DISCONTINUED | OUTPATIENT
Start: 2021-03-04 | End: 2021-03-04

## 2021-03-04 RX ORDER — SODIUM CHLORIDE 0.9 % (FLUSH) 0.9 %
10 SYRINGE (ML) INJECTION
Status: DISCONTINUED | OUTPATIENT
Start: 2021-03-04 | End: 2021-03-04 | Stop reason: HOSPADM

## 2021-03-04 RX ORDER — SODIUM CHLORIDE 9 MG/ML
INJECTION, SOLUTION INTRAVENOUS CONTINUOUS
Status: DISCONTINUED | OUTPATIENT
Start: 2021-03-04 | End: 2021-03-04 | Stop reason: HOSPADM

## 2021-03-04 RX ORDER — PHENYLEPHRINE HYDROCHLORIDE 10 MG/ML
INJECTION INTRAVENOUS
Status: DISCONTINUED | OUTPATIENT
Start: 2021-03-04 | End: 2021-03-04

## 2021-03-04 RX ADMIN — LIDOCAINE HYDROCHLORIDE 50 MG: 20 INJECTION, SOLUTION INTRAVENOUS at 10:03

## 2021-03-04 RX ADMIN — PHENYLEPHRINE HYDROCHLORIDE 100 MCG: 10 INJECTION INTRAVENOUS at 10:03

## 2021-03-04 RX ADMIN — PROPOFOL 150 MCG/KG/MIN: 10 INJECTION, EMULSION INTRAVENOUS at 10:03

## 2021-03-04 RX ADMIN — PHENYLEPHRINE HYDROCHLORIDE 100 MCG: 10 INJECTION INTRAVENOUS at 11:03

## 2021-03-04 RX ADMIN — PROPOFOL 50 MG: 10 INJECTION, EMULSION INTRAVENOUS at 10:03

## 2021-03-04 RX ADMIN — SODIUM CHLORIDE: 0.9 INJECTION, SOLUTION INTRAVENOUS at 08:03

## 2021-03-05 ENCOUNTER — TELEPHONE (OUTPATIENT)
Dept: ENDOSCOPY | Facility: HOSPITAL | Age: 66
End: 2021-03-05

## 2021-03-10 LAB
FINAL PATHOLOGIC DIAGNOSIS: NORMAL
GROSS: NORMAL
Lab: NORMAL

## 2021-03-11 ENCOUNTER — TELEPHONE (OUTPATIENT)
Dept: NEUROLOGY | Facility: HOSPITAL | Age: 66
End: 2021-03-11

## 2021-03-15 ENCOUNTER — TELEPHONE (OUTPATIENT)
Dept: NEUROLOGY | Facility: HOSPITAL | Age: 66
End: 2021-03-15

## 2021-03-17 ENCOUNTER — OFFICE VISIT (OUTPATIENT)
Dept: NEUROLOGY | Facility: HOSPITAL | Age: 66
End: 2021-03-17
Attending: INTERNAL MEDICINE
Payer: MEDICARE

## 2021-03-17 DIAGNOSIS — K63.5 POLYP OF COLON, UNSPECIFIED PART OF COLON, UNSPECIFIED TYPE: Primary | ICD-10-CM

## (undated) DEVICE — SUT VICRYL 4-0 27 SH

## (undated) DEVICE — UNDERGLOVES BIOGEL PI SZ 7 LF

## (undated) DEVICE — SEE MEDLINE ITEM 156930

## (undated) DEVICE — SUT 4-0 12-18IN SILK BLACK

## (undated) DEVICE — DRESSING TELFA STRL 4X3 LF

## (undated) DEVICE — DRESSING DERMACEA SPNG 10S

## (undated) DEVICE — TRAY FOLEY 16FR INFECTION CONT

## (undated) DEVICE — TRAY DRY SKIN SCRUB PREP

## (undated) DEVICE — GLOVE BIOGEL SKINSENSE PI 7.5

## (undated) DEVICE — COVER SURG LIGHT HANDLE

## (undated) DEVICE — DRAPE HEAD/BAR 40 X 27 W/ADH

## (undated) DEVICE — POSITIONER HEEL FOAM CONVOLTD

## (undated) DEVICE — SUT 4/0 18IN ETHILON BL P3

## (undated) DEVICE — PROBE PRASS SLIM

## (undated) DEVICE — SPONGE DERMACEA GAUZE 4X4

## (undated) DEVICE — BANDAGE KERLIX P/P 2.25IN STER

## (undated) DEVICE — ELECTRODE BLD EXT INSUL 1

## (undated) DEVICE — SPONGE LAP 18X18 PREWASHED

## (undated) DEVICE — SOL NS 1000CC

## (undated) DEVICE — NDL HYPO REG 25G X 1 1/2

## (undated) DEVICE — SEE MEDLINE ITEM 152622

## (undated) DEVICE — ADHESIVE DERMABOND ADVANCED

## (undated) DEVICE — UNDERGLOVE BIOGEL PI SZ 6.5 LF

## (undated) DEVICE — ELECTRODE REM PLYHSV RETURN 9

## (undated) DEVICE — SEE MEDLINE ITEM 152537

## (undated) DEVICE — KIT SURGIFLO HEMOSTATIC MATRIX

## (undated) DEVICE — DRESSING TRANS 2X2 TEGADERM

## (undated) DEVICE — SEE MEDLINE ITEM 157166

## (undated) DEVICE — SUT ETHILON 4-0 BLK PS-4-18

## (undated) DEVICE — SKIN MARKER DEVON 160

## (undated) DEVICE — SUT 3-0 12-18IN SILK

## (undated) DEVICE — SEE MEDLINE ITEM 157110

## (undated) DEVICE — CHLORAPREP 10.5 ML APPLICATOR

## (undated) DEVICE — DRAIN FLAT JP 7X4MM FUL

## (undated) DEVICE — DRAPE STERI INSTRUMENT 1018

## (undated) DEVICE — TAPE SURGICAL MICROFOAM 3IN

## (undated) DEVICE — SUT PROLENE 5/0 RB-1 36 IN

## (undated) DEVICE — SUT SILK 2-0 PS 18IN BLACK

## (undated) DEVICE — CLOSURE SKIN STERI STRIP 1/2X4

## (undated) DEVICE — GLOVE BIOGEL SKINSENSE PI 6.5

## (undated) DEVICE — CORD BIPOLAR ELECTROSURGICAL

## (undated) DEVICE — SUT CHROMIC 3-0 SH 27IN GUT

## (undated) DEVICE — GLOVE BIOGEL SKINSENSE PI 8.0

## (undated) DEVICE — Device

## (undated) DEVICE — SEE MEDLINE ITEM 153151

## (undated) DEVICE — POSITIONER IV ARMBOARD FOAM

## (undated) DEVICE — POWDER ARISTA AH 3G

## (undated) DEVICE — CORD FOR BIPOLAR FORCEPS 12

## (undated) DEVICE — SUT ETHILON 5-0 P3 18IN BLK

## (undated) DEVICE — DRAIN PENROSE XRAY 18 X 1/4 ST

## (undated) DEVICE — CONTAINER SPECIMEN STRL 4OZ

## (undated) DEVICE — APPLIER LIGACLIP SM 9.38IN

## (undated) DEVICE — SUT ETHILON 4-0 BLK MONO

## (undated) DEVICE — DRAPE SPLIT 77 X 108

## (undated) DEVICE — SPONGE KITTNER 1/4X 5/8 L STRL

## (undated) DEVICE — DRESSING SURGICAL 1/2X1/2

## (undated) DEVICE — SYR 10CC LUER LOCK

## (undated) DEVICE — DRESSING GZ SURGICOUNT 4X8

## (undated) DEVICE — DRESSING TRANS 4X4 TEGADERM

## (undated) DEVICE — GLOVE BIOGEL SKINSENSE PI 7.0

## (undated) DEVICE — SUT ETHILON 5-0 PS-2 18IN

## (undated) DEVICE — SEE MEDLINE ITEM 157194

## (undated) DEVICE — BLADE SURG #15 CARBON STEEL

## (undated) DEVICE — POSITIONER HEAD DONUT 9IN FOAM

## (undated) DEVICE — SEE MEDLINE ITEM 146308

## (undated) DEVICE — BLADE SURG STAINLESS STEEL #12

## (undated) DEVICE — STAPLER SKIN ROTATING HEAD

## (undated) DEVICE — SPONGE NEURO 1/2 X 2

## (undated) DEVICE — SUT VICRYL 3-0 27 SH

## (undated) DEVICE — ELECTRODE EMG NEEDLE

## (undated) DEVICE — STIMULATOR NRVE MINI VARI-STIM

## (undated) DEVICE — HEMOSTAT SURGICEL 4X8IN

## (undated) DEVICE — EVACUATOR WOUND BULB 100CC